# Patient Record
Sex: FEMALE | Race: WHITE | NOT HISPANIC OR LATINO | Employment: FULL TIME | URBAN - METROPOLITAN AREA
[De-identification: names, ages, dates, MRNs, and addresses within clinical notes are randomized per-mention and may not be internally consistent; named-entity substitution may affect disease eponyms.]

---

## 2023-03-30 LAB
CHLAMYDIA TRACH., AMPLIFIED: NEGATIVE
N. GONORRHEA, AMPLIFIED: NEGATIVE

## 2023-09-04 PROBLEM — J45.909 MODERATE ASTHMA WITHOUT COMPLICATION (HHS-HCC): Status: ACTIVE | Noted: 2023-09-04

## 2023-09-04 PROBLEM — R11.0 MODERATE NAUSEA: Status: ACTIVE | Noted: 2023-09-04

## 2023-09-04 PROBLEM — K58.0 IRRITABLE BOWEL SYNDROME WITH DIARRHEA: Status: ACTIVE | Noted: 2023-09-04

## 2023-09-04 PROBLEM — K21.9 GERD (GASTROESOPHAGEAL REFLUX DISEASE): Status: ACTIVE | Noted: 2023-09-04

## 2023-09-04 PROBLEM — H69.90 EUSTACHIAN TUBE DYSFUNCTION: Status: ACTIVE | Noted: 2023-09-04

## 2023-09-04 PROBLEM — R31.29 OTHER MICROSCOPIC HEMATURIA: Status: ACTIVE | Noted: 2023-09-04

## 2023-09-04 PROBLEM — F41.9 ANXIETY: Status: ACTIVE | Noted: 2023-09-04

## 2023-09-04 PROBLEM — R10.13 CHRONIC EPIGASTRIC PAIN: Status: ACTIVE | Noted: 2023-09-04

## 2023-09-04 PROBLEM — G89.29 CHRONIC EPIGASTRIC PAIN: Status: ACTIVE | Noted: 2023-09-04

## 2023-09-04 PROBLEM — R55 VASO VAGAL EPISODE: Status: ACTIVE | Noted: 2023-09-04

## 2023-09-04 PROBLEM — J30.2 OTHER SEASONAL ALLERGIC RHINITIS: Status: ACTIVE | Noted: 2023-09-04

## 2023-09-04 PROBLEM — K29.60 BILE REFLUX GASTRITIS: Status: ACTIVE | Noted: 2023-09-04

## 2023-09-04 PROBLEM — F31.9 BIPOLAR AFFECTIVE DISORDER (MULTI): Status: ACTIVE | Noted: 2023-09-04

## 2023-09-04 PROBLEM — K44.9 HIATAL HERNIA: Status: ACTIVE | Noted: 2023-09-04

## 2023-09-04 RX ORDER — ALBUTEROL SULFATE 0.83 MG/ML
SOLUTION RESPIRATORY (INHALATION)
COMMUNITY
Start: 2018-06-21

## 2023-09-04 RX ORDER — MONTELUKAST SODIUM 4 MG/1
TABLET, CHEWABLE ORAL
COMMUNITY
Start: 2022-04-12 | End: 2023-10-11 | Stop reason: ALTCHOICE

## 2023-09-04 RX ORDER — NORGESTREL AND ETHINYL ESTRADIOL 0.3-0.03MG
1 KIT ORAL DAILY
COMMUNITY
Start: 2023-01-14 | End: 2024-01-22 | Stop reason: SDUPTHER

## 2023-09-04 RX ORDER — BUSPIRONE HYDROCHLORIDE 5 MG/1
1 TABLET ORAL 2 TIMES DAILY
COMMUNITY
Start: 2022-03-03 | End: 2024-04-08 | Stop reason: SDUPTHER

## 2023-09-04 RX ORDER — DESIPRAMINE HYDROCHLORIDE 10 MG/1
1 TABLET ORAL DAILY
COMMUNITY
Start: 2022-05-11 | End: 2023-10-11 | Stop reason: SDUPTHER

## 2023-09-04 RX ORDER — ARIPIPRAZOLE 5 MG/1
1 TABLET ORAL DAILY
COMMUNITY
Start: 2022-03-03 | End: 2023-09-27

## 2023-09-20 ENCOUNTER — OFFICE VISIT (OUTPATIENT)
Dept: PRIMARY CARE | Facility: CLINIC | Age: 33
End: 2023-09-20
Payer: COMMERCIAL

## 2023-09-20 VITALS
HEART RATE: 96 BPM | DIASTOLIC BLOOD PRESSURE: 70 MMHG | BODY MASS INDEX: 26.84 KG/M2 | SYSTOLIC BLOOD PRESSURE: 122 MMHG | WEIGHT: 171 LBS | HEIGHT: 67 IN

## 2023-09-20 DIAGNOSIS — J06.9 UPPER RESPIRATORY TRACT INFECTION, UNSPECIFIED TYPE: Primary | ICD-10-CM

## 2023-09-20 DIAGNOSIS — F31.9 BIPOLAR AFFECTIVE DISORDER, REMISSION STATUS UNSPECIFIED (MULTI): ICD-10-CM

## 2023-09-20 PROCEDURE — 99214 OFFICE O/P EST MOD 30 MIN: CPT | Performed by: STUDENT IN AN ORGANIZED HEALTH CARE EDUCATION/TRAINING PROGRAM

## 2023-09-20 PROCEDURE — 87636 SARSCOV2 & INF A&B AMP PRB: CPT

## 2023-09-20 PROCEDURE — 1036F TOBACCO NON-USER: CPT | Performed by: STUDENT IN AN ORGANIZED HEALTH CARE EDUCATION/TRAINING PROGRAM

## 2023-09-20 NOTE — PROGRESS NOTES
"Evangelina Noriega is a 33 y.o. year old female presenting for Follow-up and Dizziness       HPI:  Patient presents for sick visit. She started feeling symptoms the middle of last week (9/13), vomiting, diarrhea, chills, body aches, headache, lasted 2 days. Felt better over the weekend but then on Tuesday evening (9/19) started feeling fever/chills, congestion, sore throat, dizzy, lightheaded when standing up, slight shortness of breath.     Diarrhea has resolved. Vomited yesterday but thought it was the food she ate Monday night, [employer] chicken strip basket.    Sick exposure was, brother has a cold at home now. Several coworkers were sick last week with similar GI symptoms.    Tries to drink 2 64 ounce bottles of water daily. Has been able to eat okay.    Only taking tylenol and tylenol sinus headache for symptoms. Has not tested herself for flu or COVID.    ROS:   All pertinent positive symptoms are included in history of present illness.    All other systems have been reviewed and are negative and noncontributory to this patient's current ailments.    Current Outpatient Medications   Medication Sig Dispense Refill    albuterol 2.5 mg /3 mL (0.083 %) nebulizer solution USE 1 UNIT DOSE EVERY 4-6 HOURS AS NEEDED FOR WHEEZING .      ARIPiprazole (Abilify) 5 mg tablet Take 1 tablet (5 mg) by mouth once daily.      busPIRone (Buspar) 5 mg tablet Take 1 tablet (5 mg) by mouth 2 times a day.      colestipol (Colestid) 1 gram tablet Colestipol HCl - 1 GM Oral Tablet   Quantity: 60  Refills: 0        Start : 12-Apr-2022   Active      Cryselle, 28, 0.3-30 mg-mcg tablet Take 1 tablet by mouth once daily.      desipramine (Norpramin) 10 mg tablet Take 1 tablet (10 mg) by mouth once daily.       No current facility-administered medications for this visit.       OBJECTIVE  Visit Vitals  /70   Pulse 96   Ht 1.702 m (5' 7\")   Wt 77.6 kg (171 lb)   BMI 26.78 kg/m²   Smoking Status Never   BSA 1.92 m²        Physical " Exam:  GENERAL: Alert, oriented, pleasant, in no acute distress  HEENT: Head normocephalic, atraumatic, posterior pharyngeal erythema and cobblestoning, serous effusion bilateral TMs, nares erythematous bilaterally, no tonsilar hypertrophy or exudate  CV: Heart with regular rate and rhythm, normal S1/S2, no murmurs; normal peripheral pulses- radial and dorsalis pedis palpable  LUNGS: CTAB without wheezing, rhonchi or rales; good respiratory effort, no increased work of breathing  ABDOMEN: soft, non-tender, non-distended, no masses appreciated; +BS in all four quadrants  EXTREMITIES: no edema, no cyanosis  PSYCH: Appropriate mood and affect  SKIN: No rashes or lesions appreciated    Assessment/Plan   Diagnoses and all orders for this visit:  Upper respiratory tract infection, unspecified type  -     Sars-CoV-2 PCR, Symptomatic; Future  -     Influenza A, and B PCR; Future  Continue adequate hydration, vitals are stable, afebrile in office today. Encouraged gatorade, pedialyte and water. Eat as tolerated. Will call with results of swab. Note provided for work today.  Bipolar affective disorder, remission status unspecified (CMS/HCC)  Stable on Abilify and Buspar. Continue current medications.

## 2023-09-21 LAB
FLU A RESULT: NOT DETECTED
FLU B RESULT: NOT DETECTED
SARS-COV-2 RESULT: NOT DETECTED

## 2023-09-27 DIAGNOSIS — F31.9 BIPOLAR AFFECTIVE DISORDER, REMISSION STATUS UNSPECIFIED (MULTI): Primary | ICD-10-CM

## 2023-09-27 RX ORDER — ARIPIPRAZOLE 5 MG/1
5 TABLET ORAL DAILY
Qty: 90 TABLET | Refills: 1 | Status: SHIPPED | OUTPATIENT
Start: 2023-09-27 | End: 2024-02-09 | Stop reason: SINTOL

## 2023-10-11 ENCOUNTER — OFFICE VISIT (OUTPATIENT)
Dept: GASTROENTEROLOGY | Facility: CLINIC | Age: 33
End: 2023-10-11
Payer: COMMERCIAL

## 2023-10-11 VITALS — HEIGHT: 67 IN | HEART RATE: 80 BPM | WEIGHT: 174 LBS | BODY MASS INDEX: 27.31 KG/M2

## 2023-10-11 DIAGNOSIS — K58.0 IRRITABLE BOWEL SYNDROME WITH DIARRHEA: Primary | ICD-10-CM

## 2023-10-11 PROCEDURE — 1036F TOBACCO NON-USER: CPT | Performed by: INTERNAL MEDICINE

## 2023-10-11 PROCEDURE — 99213 OFFICE O/P EST LOW 20 MIN: CPT | Performed by: INTERNAL MEDICINE

## 2023-10-11 RX ORDER — DESIPRAMINE HYDROCHLORIDE 10 MG/1
10 TABLET ORAL DAILY
Qty: 90 TABLET | Refills: 3 | Status: SHIPPED | OUTPATIENT
Start: 2023-10-11 | End: 2024-04-22

## 2023-10-11 ASSESSMENT — ENCOUNTER SYMPTOMS
OCCASIONAL FEELINGS OF UNSTEADINESS: 0
LOSS OF SENSATION IN FEET: 0
DEPRESSION: 1

## 2023-10-11 NOTE — PATIENT INSTRUCTIONS
Continue supplemental milligrams daily.  For occasional constipation take single dose of MiraLAX when constipation starts.  Pursue surveillance colonoscopy in 4 years.  Follow-up in the office as needed.

## 2023-10-11 NOTE — PROGRESS NOTES
REASON FOR VISIT: Follow-up irritable bowel    HPI:  Evangelina Noriega is a 33 y.o. female with a past medical history of diarrhea predominant irritable bowel here for follow-up.  Has been managed on desipramine 10 mg daily.  Colonoscopy last year with subcentimeter tubular adenoma resected and no evidence of microscopic colitis.  Occasionally will have constipation for a day or 2 but diarrhea otherwise controlled.  No unintentional weight loss.        PRIOR ENDOSCOPY  Colonoscopy with subcentimeter tubular adenoma resected in 2022 no evidence of microscopic colitis    PAST MEDICAL HISTORY  Past Medical History:   Diagnosis Date    Personal history of other diseases of the digestive system 12/02/2015    History of indigestion    Personal history of other diseases of the respiratory system     History of asthma    Personal history of other mental and behavioral disorders     History of anxiety    Personal history of other mental and behavioral disorders     History of bipolar disorder    Personal history of other specified conditions 09/30/2015    History of epigastric pain    Personal history of other specified conditions 08/28/2015    History of nausea    Personal history of other specified conditions 08/18/2014    History of dizziness    Personal history of other specified conditions 08/18/2014    History of fatigue       PAST SURGICAL HISTORY  Past Surgical History:   Procedure Laterality Date    OTHER SURGICAL HISTORY  07/01/2014    Prior Surgical Procedure Not Done    OTHER SURGICAL HISTORY  06/21/2018    Oral Surgery Tooth Extraction [place] [facility]       FAMILY HISTORY  Family History   Problem Relation Name Age of Onset    Graves' disease Mother      Stroke Father         SOCIAL HISTORY  Social History     Tobacco Use    Smoking status: Never    Smokeless tobacco: Never   Substance Use Topics    Alcohol use: Yes       REVIEW OF SYSTEMS  CONSTITUTIONAL: negative for fever, chills, fatigue, or unintentional weight  "loss,   HEENT: negative for icteric sclera, eye pain/redness, or changes in vision/hearing  RESPIRATORY: negative for cough, hemoptysis, wheezing, orthopnea, or dyspnea on exertion  CARDIOVASCULAR: negative for chest pain, palpitations, or syncope   GASTROINTESTINAL: as noted per HPI  GENITOURINARY: negative for dysuria, polyuria, incontinence, or hematuria  MUSCULOSKELETAL: negative for arthralgia, myalgia, or joint swelling/stiffness   INTEGUMENTARY/SKIN: negative jaundice, rash, or skin lesion  HEMATOLOGIC/LYMPHATIC: negative for prolonged bleeding, easy bruising, or swollen lymph nodes  ENDOCRINE: negative for cold/heat intolerance, polydipsia, polyuria, or goiter  NEUROLOGIC: negative for headaches, dizziness, tremor, or gait abnormality  PSYCHIATRIC: negative for anxiety, depression, personality changes, or sleep disturbances      A 10 point review of systems was completed and was otherwise negative.    ALLERGIES  Allergies   Allergen Reactions    Amoxicillin Hives    Nitrofurantoin Monohyd/M-Cryst Hives       MEDICATIONS  Current Outpatient Medications   Medication Sig Dispense Refill    albuterol 2.5 mg /3 mL (0.083 %) nebulizer solution USE 1 UNIT DOSE EVERY 4-6 HOURS AS NEEDED FOR WHEEZING .      ARIPiprazole (Abilify) 5 mg tablet TAKE 1 TABLET BY MOUTH EVERY DAY 90 tablet 1    busPIRone (Buspar) 5 mg tablet Take 1 tablet (5 mg) by mouth 2 times a day.      Cryselle, 28, 0.3-30 mg-mcg tablet Take 1 tablet by mouth once daily.      colestipol (Colestid) 1 gram tablet Colestipol HCl - 1 GM Oral Tablet   Quantity: 60  Refills: 0        Start : 12-Apr-2022   Active      desipramine (Norpramin) 10 mg tablet Take 1 tablet (10 mg) by mouth once daily. 90 tablet 3     No current facility-administered medications for this visit.       VITALS  Pulse 80   Ht 1.702 m (5' 7\")   Wt 78.9 kg (174 lb)   BMI 27.25 kg/m²      PHYSICAL EXAM  CONSTITUTIONAL: no acute distress, appears stated age, well-appearing  EYES: " anicteric sclera, sclera clear, no conjunctival pallor  HEAD: normocephalic, atraumatic   NECK: supple   PULMONARY: clear to auscultation bilaterally, no increased work of breathing   CARDIOVASCULAR: regular rate and rhythm, no murmurs/rubs/gallops appreciated  ABDOMEN: soft, non-tender, positive bowel sounds, no rebound or guarding, no appreciable hepatosplenomegaly  LYMPHATIC: no palpable lymphadenopathy in the neck  MUSCULOSKELETAL: normal gait, no cyanosis/clubbing/edema  SKIN: no jaundice or visible rash  NEUROLOGIC: no gross motor deficits, cranial nerves grossly intact  PSYCHIATRIC: alert and oriented to person/place/time, appropriate insight and judgement        ASSESSMENT  Therapy IBS managed on desipramine 10 mg daily.  Occasional constipation.  She had subcentimeter tubular adenoma resected on colonoscopy in 2022 and no evidence of microscopic colitis then.    PLAN  Continue supplemental milligrams daily.  For occasional constipation take single dose of MiraLAX when constipation starts.  Pursue surveillance colonoscopy in 4 years.  Follow-up in the office as needed.        Signature: Michael Nagy MD    Date: 10/11/2023  Time: 2:26 PM

## 2024-01-22 DIAGNOSIS — Z30.9 ENCOUNTER FOR CONTRACEPTIVE MANAGEMENT, UNSPECIFIED TYPE: ICD-10-CM

## 2024-01-22 RX ORDER — NORGESTREL AND ETHINYL ESTRADIOL 0.3-0.03MG
1 KIT ORAL DAILY
Qty: 28 TABLET | Refills: 4 | Status: SHIPPED | OUTPATIENT
Start: 2024-01-22 | End: 2024-02-13 | Stop reason: SDUPTHER

## 2024-01-22 NOTE — TELEPHONE ENCOUNTER
Evangelina's mom scheduled her an annual with Rhonda Cullen on 04/01/24, she states Evangelina needs refills of Cryselle 28, 0.3-30 MG-MCG to be sent to [facility]

## 2024-01-31 ENCOUNTER — APPOINTMENT (OUTPATIENT)
Dept: PRIMARY CARE | Facility: CLINIC | Age: 34
End: 2024-01-31
Payer: MEDICAID

## 2024-02-09 ENCOUNTER — OFFICE VISIT (OUTPATIENT)
Dept: PRIMARY CARE | Facility: CLINIC | Age: 34
End: 2024-02-09
Payer: COMMERCIAL

## 2024-02-09 VITALS
DIASTOLIC BLOOD PRESSURE: 88 MMHG | WEIGHT: 160 LBS | SYSTOLIC BLOOD PRESSURE: 140 MMHG | HEIGHT: 67 IN | BODY MASS INDEX: 25.11 KG/M2 | HEART RATE: 102 BPM

## 2024-02-09 DIAGNOSIS — J45.909 MODERATE ASTHMA WITHOUT COMPLICATION, UNSPECIFIED WHETHER PERSISTENT (HHS-HCC): ICD-10-CM

## 2024-02-09 DIAGNOSIS — Z00.00 ENCOUNTER FOR PREVENTIVE HEALTH EXAMINATION: ICD-10-CM

## 2024-02-09 DIAGNOSIS — Z11.59 ENCOUNTER FOR HEPATITIS C SCREENING TEST FOR LOW RISK PATIENT: ICD-10-CM

## 2024-02-09 DIAGNOSIS — F31.9 BIPOLAR AFFECTIVE DISORDER, REMISSION STATUS UNSPECIFIED (MULTI): Primary | ICD-10-CM

## 2024-02-09 PROBLEM — R19.7 DIARRHEA: Status: ACTIVE | Noted: 2021-11-18

## 2024-02-09 PROCEDURE — 1036F TOBACCO NON-USER: CPT | Performed by: STUDENT IN AN ORGANIZED HEALTH CARE EDUCATION/TRAINING PROGRAM

## 2024-02-09 PROCEDURE — 99214 OFFICE O/P EST MOD 30 MIN: CPT | Performed by: STUDENT IN AN ORGANIZED HEALTH CARE EDUCATION/TRAINING PROGRAM

## 2024-02-09 RX ORDER — LAMOTRIGINE 25(42)-100
KIT ORAL
Qty: 1 KIT | Refills: 0 | Status: SHIPPED | OUTPATIENT
Start: 2024-02-09 | End: 2024-02-12

## 2024-02-09 ASSESSMENT — PATIENT HEALTH QUESTIONNAIRE - PHQ9
SUM OF ALL RESPONSES TO PHQ9 QUESTIONS 1 AND 2: 0
1. LITTLE INTEREST OR PLEASURE IN DOING THINGS: NOT AT ALL
2. FEELING DOWN, DEPRESSED OR HOPELESS: NOT AT ALL

## 2024-02-09 NOTE — PROGRESS NOTES
"Today's Date: 2/9/2024       HPI: Evangelina Noriega is a 34 y.o. female who presents for mental health.      Bipolar affective disorder  Patient was recently prescribed seroquel, was on it for a week and a half until she experienced a panic attack where she was subsequently seen in the urgent care, was told to stop seroquel. States this medication made her drowsy, jittery, and had increased anxiety. Has not been on a mood stabilizer since. Had previously tried Latuda, states it was the worst medication she had ever taken, experienced many side effects. Also tried Abilify, states it helped with her anxiety but didn't help stabilize her mood well enough. She had been on Lamictal in the past, states she believes she was taking 200 mg. Says it was effective for quite some time until it began to \"wear off.\" Patient denies SI/HI.     Objective:  Vitals: Blood pressure 140/88, pulse 102, height 1.702 m (5' 7\"), weight 72.6 kg (160 lb).    Body mass index is 25.06 kg/m².     Physical Examination:      General: No acute distress, well nourished  Eyes: EOMI  Ears: External ears intact, no gross fluid drainage, swelling, or erythema  Pulmonary: Clear to auscultation bilaterally, no wheezing, no rales, no accessory muscle use  Cardiac: RRR, no murmurs or rubs  Extremities: No clubbing, cyanosis, or edema  Psychiatric: Affect and mood is appropriate  Neurologic: AOx3  Skin: No rashes observed, warm     Assessment and Plan:     1. Bipolar affective disorder, remission status unspecified (CMS/HCC)  lamoTRIgine (LaMICtal Starter, Orange, Kit) 25 mg (42) -100 mg (7) tablets,dose pack      2. Moderate asthma without complication, unspecified whether persistent        3. Encounter for preventive health examination  CBC    Comprehensive Metabolic Panel    Hemoglobin A1C    Lipid Panel    TSH with reflex to Free T4 if abnormal      4. Encounter for hepatitis C screening test for low risk patient  Hepatitis C Antibody           1. Bipolar " affective disorder    Rx: Lamotrigine titration as directed  Call office if mood worsens after initiating this medication     2. Preventative health  Routine blood work ordered - FLP, CBC, CMP, TSH w/ reflex, A1C  Hep C antibody screening for low risk patient     3. Asthma  Controlled on PRN albuterol        All questions answered. Patient understands and agrees to the plan.      Please follow-up in one month or sooner if needed

## 2024-02-12 ENCOUNTER — TELEPHONE (OUTPATIENT)
Dept: PRIMARY CARE | Facility: CLINIC | Age: 34
End: 2024-02-12
Payer: MEDICAID

## 2024-02-12 DIAGNOSIS — F31.32 BIPOLAR AFFECTIVE DISORDER, CURRENTLY DEPRESSED, MODERATE (MULTI): Primary | ICD-10-CM

## 2024-02-12 RX ORDER — LAMOTRIGINE 25 MG/1
TABLET ORAL
Qty: 70 TABLET | Refills: 0 | Status: SHIPPED | OUTPATIENT
Start: 2024-02-12 | End: 2024-03-19

## 2024-02-12 NOTE — TELEPHONE ENCOUNTER
Mom called and asked if we could send over the Lamictal in three separate scripts so it would be cheaper.

## 2024-02-13 ENCOUNTER — OFFICE VISIT (OUTPATIENT)
Dept: OBSTETRICS AND GYNECOLOGY | Facility: CLINIC | Age: 34
End: 2024-02-13
Payer: COMMERCIAL

## 2024-02-13 VITALS — WEIGHT: 163.8 LBS | DIASTOLIC BLOOD PRESSURE: 62 MMHG | BODY MASS INDEX: 25.66 KG/M2 | SYSTOLIC BLOOD PRESSURE: 100 MMHG

## 2024-02-13 DIAGNOSIS — Z01.419 ENCOUNTER FOR WELL WOMAN EXAM WITH ROUTINE GYNECOLOGICAL EXAM: Primary | ICD-10-CM

## 2024-02-13 DIAGNOSIS — Z30.9 ENCOUNTER FOR CONTRACEPTIVE MANAGEMENT, UNSPECIFIED TYPE: ICD-10-CM

## 2024-02-13 PROCEDURE — 1036F TOBACCO NON-USER: CPT | Performed by: NURSE PRACTITIONER

## 2024-02-13 PROCEDURE — 99395 PREV VISIT EST AGE 18-39: CPT | Performed by: NURSE PRACTITIONER

## 2024-02-13 RX ORDER — NORGESTREL AND ETHINYL ESTRADIOL 0.3-0.03MG
1 KIT ORAL DAILY
Qty: 84 TABLET | Refills: 3 | Status: SHIPPED | OUTPATIENT
Start: 2024-02-13 | End: 2025-02-12

## 2024-02-13 NOTE — PROGRESS NOTES
HPI:   Evangelina Noriega is a 34 y.o. who presents today for her annual gynecologic exam without complaints    She has the following concerns; None. She is doing well. Working on medication management for her bipolar disorder. She has no GYN concerns. She needs refills of her ocp.     GYN HISTORY:  Periods are regular every 28-30 days, lasting 4 days.   Dysmenorrhea:none. Cyclic symptoms include none.   No intermenstrual bleeding, spotting, or discharge.    Current contraception: OCP (estrogen/progesterone)      Requests STD testing: no     PAP History   Last pap:   2023 Normal HPV Negative  History of abnormal pap: yes - ASCUS in 2016.   HPV vaccine: yes - x 1 dose.   @paphx@    Health Screening  Family history of breast, uterine, ovarian or colon cancer: no         The patient feels safe at home.         Review of Systems:   Constitutional: no fever and no chills.  Cardiovascular: no chest pain.   Respiratory: no shortness of breath.   Gastrointestinal: no nausea, no abdominal pain and no constipation  Genitourinary: no dysuria, no urinary incontinence, no vaginal dryness, no pelvic pain and no vaginal discharge.   Neurological: no headache.  Psychiatric: no anxiety and no depression.              Objective         /62   Wt 74.3 kg (163 lb 12.8 oz)   LMP 01/23/2024 (Approximate)   BMI 25.66 kg/m²         Physical Exam:   Constitutional: Alert and in no acute distress. Well developed, well nourished.      Neck: No neck asymmetry. Supple. Thyroid not enlarged and there were no palpable thyroid nodules.      Cardiovascular: Heart rate and rhythm were normal, normal S1 and S2, no gallops, and no murmurs.      Pulmonary: No respiratory distress. Clear bilateral breath sounds.      Chest: Breasts: Normal appearance, no nipple discharge and no skin changes. Palpation of breasts and axillae: No palpable mass and no axillary lymphadenopathy.      Abdomen: Soft nontender; no abdominal mass palpated. Normal  bowel sounds. No organomegaly.      Genitourinary:   - External genitalia: Normal.   - Palpation of lymph nodes in groin: No inguinal lymphadenopathy.   - Bartholin's Urethral and Skenes Glands: Normal.   - Urethra: Normal.    -Bladder: Normal on palpation.   - Vagina: Normal.   - Cervix: Normal.   - Uterus: Normal. Right Adnexa/parametria: Normal. Left Adnexa/parametria: Normal.   - Perianal Area: Normal.      Skin: Normal skin color and pigmentation, normal skin turgor, and no rash     Psychiatric: Alert and oriented x 3. Affect normal to patient baseline. Mood: Appropriate.            Assessment/Plan       Diagnoses and all orders for this visit:  Encounter for well woman exam with routine gynecological exam  Evangelina presents for well woman exam. She is doing well. Working on some medication changes. Has started taking Lamictal as this has worked well for her in the past. Discussed use of Lamictal and ocp, may decrease effectiveness of both meds. She verbalizes understanding. Encouraged condom use.    Encounter for contraceptive management, unspecified type  -     norgestrel-ethinyl estradioL (Cryselle, Jr,) 0.3-30 mg-mcg tablet; Take 1 tablet by mouth once daily.  Follow-up as needed and annually for well woman exam.       SHARONDA Penny-CNP

## 2024-02-28 ENCOUNTER — LAB (OUTPATIENT)
Dept: LAB | Facility: LAB | Age: 34
End: 2024-02-28
Payer: COMMERCIAL

## 2024-02-28 DIAGNOSIS — Z00.00 ENCOUNTER FOR PREVENTIVE HEALTH EXAMINATION: ICD-10-CM

## 2024-02-28 DIAGNOSIS — Z11.59 ENCOUNTER FOR HEPATITIS C SCREENING TEST FOR LOW RISK PATIENT: ICD-10-CM

## 2024-02-28 LAB
ALBUMIN SERPL BCP-MCNC: 4 G/DL (ref 3.4–5)
ALP SERPL-CCNC: 79 U/L (ref 33–110)
ALT SERPL W P-5'-P-CCNC: 8 U/L (ref 7–45)
ANION GAP SERPL CALC-SCNC: 15 MMOL/L (ref 10–20)
AST SERPL W P-5'-P-CCNC: 12 U/L (ref 9–39)
BILIRUB SERPL-MCNC: 0.6 MG/DL (ref 0–1.2)
BUN SERPL-MCNC: 10 MG/DL (ref 6–23)
CALCIUM SERPL-MCNC: 9 MG/DL (ref 8.6–10.6)
CHLORIDE SERPL-SCNC: 101 MMOL/L (ref 98–107)
CHOLEST SERPL-MCNC: 143 MG/DL (ref 0–199)
CHOLESTEROL/HDL RATIO: 1.8
CO2 SERPL-SCNC: 27 MMOL/L (ref 21–32)
CREAT SERPL-MCNC: 0.8 MG/DL (ref 0.5–1.05)
EGFRCR SERPLBLD CKD-EPI 2021: >90 ML/MIN/1.73M*2
ERYTHROCYTE [DISTWIDTH] IN BLOOD BY AUTOMATED COUNT: 12.7 % (ref 11.5–14.5)
EST. AVERAGE GLUCOSE BLD GHB EST-MCNC: 97 MG/DL
GLUCOSE SERPL-MCNC: 82 MG/DL (ref 74–99)
HBA1C MFR BLD: 5 %
HCT VFR BLD AUTO: 42.4 % (ref 36–46)
HCV AB SER QL: NONREACTIVE
HDLC SERPL-MCNC: 77.8 MG/DL
HGB BLD-MCNC: 13.6 G/DL (ref 12–16)
LDLC SERPL CALC-MCNC: 43 MG/DL
MCH RBC QN AUTO: 31 PG (ref 26–34)
MCHC RBC AUTO-ENTMCNC: 32.1 G/DL (ref 32–36)
MCV RBC AUTO: 97 FL (ref 80–100)
NON HDL CHOLESTEROL: 65 MG/DL (ref 0–149)
NRBC BLD-RTO: 0 /100 WBCS (ref 0–0)
PLATELET # BLD AUTO: 277 X10*3/UL (ref 150–450)
POTASSIUM SERPL-SCNC: 3.8 MMOL/L (ref 3.5–5.3)
PROT SERPL-MCNC: 6.7 G/DL (ref 6.4–8.2)
RBC # BLD AUTO: 4.39 X10*6/UL (ref 4–5.2)
SODIUM SERPL-SCNC: 139 MMOL/L (ref 136–145)
T4 FREE SERPL-MCNC: 1.07 NG/DL (ref 0.78–1.48)
TRIGL SERPL-MCNC: 113 MG/DL (ref 0–149)
TSH SERPL-ACNC: 5.24 MIU/L (ref 0.44–3.98)
VLDL: 23 MG/DL (ref 0–40)
WBC # BLD AUTO: 8.7 X10*3/UL (ref 4.4–11.3)

## 2024-02-28 PROCEDURE — 83036 HEMOGLOBIN GLYCOSYLATED A1C: CPT

## 2024-02-28 PROCEDURE — 36415 COLL VENOUS BLD VENIPUNCTURE: CPT

## 2024-02-28 PROCEDURE — 84439 ASSAY OF FREE THYROXINE: CPT

## 2024-02-28 PROCEDURE — 80061 LIPID PANEL: CPT

## 2024-02-28 PROCEDURE — 84443 ASSAY THYROID STIM HORMONE: CPT

## 2024-02-28 PROCEDURE — 80053 COMPREHEN METABOLIC PANEL: CPT

## 2024-02-28 PROCEDURE — 86803 HEPATITIS C AB TEST: CPT

## 2024-02-28 PROCEDURE — 85027 COMPLETE CBC AUTOMATED: CPT

## 2024-03-01 DIAGNOSIS — R94.6 ABNORMAL THYROID FUNCTION TEST: Primary | ICD-10-CM

## 2024-03-12 ENCOUNTER — LAB (OUTPATIENT)
Dept: LAB | Facility: LAB | Age: 34
End: 2024-03-12
Payer: MEDICAID

## 2024-03-12 DIAGNOSIS — R94.6 ABNORMAL THYROID FUNCTION TEST: ICD-10-CM

## 2024-03-12 LAB — TSH SERPL-ACNC: 2.01 MIU/L (ref 0.44–3.98)

## 2024-03-12 PROCEDURE — 36415 COLL VENOUS BLD VENIPUNCTURE: CPT

## 2024-03-12 PROCEDURE — 84443 ASSAY THYROID STIM HORMONE: CPT

## 2024-03-19 DIAGNOSIS — F31.32 BIPOLAR AFFECTIVE DISORDER, CURRENTLY DEPRESSED, MODERATE (MULTI): ICD-10-CM

## 2024-03-19 RX ORDER — LAMOTRIGINE 25 MG/1
100 TABLET ORAL DAILY
Qty: 360 TABLET | Refills: 0 | Status: SHIPPED | OUTPATIENT
Start: 2024-03-19 | End: 2024-06-07 | Stop reason: SDUPTHER

## 2024-04-01 ENCOUNTER — APPOINTMENT (OUTPATIENT)
Dept: OBSTETRICS AND GYNECOLOGY | Facility: CLINIC | Age: 34
End: 2024-04-01
Payer: MEDICAID

## 2024-04-07 ENCOUNTER — PATIENT MESSAGE (OUTPATIENT)
Dept: PRIMARY CARE | Facility: CLINIC | Age: 34
End: 2024-04-07
Payer: MEDICAID

## 2024-04-07 DIAGNOSIS — F41.9 ANXIETY: Primary | ICD-10-CM

## 2024-04-08 RX ORDER — BUSPIRONE HYDROCHLORIDE 5 MG/1
5 TABLET ORAL 2 TIMES DAILY
Qty: 180 TABLET | Refills: 0 | Status: SHIPPED | OUTPATIENT
Start: 2024-04-08 | End: 2024-07-07

## 2024-04-20 DIAGNOSIS — K58.0 IRRITABLE BOWEL SYNDROME WITH DIARRHEA: ICD-10-CM

## 2024-04-22 RX ORDER — DESIPRAMINE HYDROCHLORIDE 10 MG/1
10 TABLET ORAL DAILY
Qty: 90 TABLET | Refills: 1 | Status: SHIPPED | OUTPATIENT
Start: 2024-04-22

## 2024-05-03 ENCOUNTER — TELEPHONE (OUTPATIENT)
Dept: OBSTETRICS AND GYNECOLOGY | Facility: CLINIC | Age: 34
End: 2024-05-03
Payer: MEDICAID

## 2024-05-03 NOTE — TELEPHONE ENCOUNTER
Patient called stating today she is experiencing large bloody tissue / clot as well as cramping. She would like to know if she should be seen for this? Please advise.

## 2024-05-03 NOTE — TELEPHONE ENCOUNTER
Spoke with patient ,pt reports no concerns for pregnancy- Appointment scheduled for Monday in [place] advised to go ER if she develops heavy bleeding or large blood clots

## 2024-05-06 ENCOUNTER — OFFICE VISIT (OUTPATIENT)
Dept: OBSTETRICS AND GYNECOLOGY | Facility: CLINIC | Age: 34
End: 2024-05-06
Payer: MEDICAID

## 2024-05-06 VITALS
SYSTOLIC BLOOD PRESSURE: 110 MMHG | WEIGHT: 167.11 LBS | DIASTOLIC BLOOD PRESSURE: 70 MMHG | BODY MASS INDEX: 26.17 KG/M2

## 2024-05-06 DIAGNOSIS — R35.0 URINARY FREQUENCY: ICD-10-CM

## 2024-05-06 DIAGNOSIS — N93.9 ABNORMAL UTERINE BLEEDING (AUB): Primary | ICD-10-CM

## 2024-05-06 LAB — PREGNANCY TEST URINE, POC: NEGATIVE

## 2024-05-06 PROCEDURE — 81025 URINE PREGNANCY TEST: CPT | Performed by: NURSE PRACTITIONER

## 2024-05-06 PROCEDURE — 99213 OFFICE O/P EST LOW 20 MIN: CPT | Performed by: NURSE PRACTITIONER

## 2024-05-06 PROCEDURE — 87086 URINE CULTURE/COLONY COUNT: CPT

## 2024-05-06 PROCEDURE — 1036F TOBACCO NON-USER: CPT | Performed by: NURSE PRACTITIONER

## 2024-05-06 ASSESSMENT — ENCOUNTER SYMPTOMS: FREQUENCY: 1

## 2024-05-06 NOTE — PROGRESS NOTES
Subjective   Patient ID: Evangelina Noriega is a 34 y.o. female who presents for Vaginal Bleeding (Passed a bloody tissue on 05/03/2024 ,cramping/ Reviewing  EMR/Last Annual Exam:  02/13/2024/Negative Pap / Negative HPV 2023/- patient declined a chaperone- ).  Vaginal Bleeding  Associated symptoms include frequency.     Here for irregular bleeding x 1 episode. Passed a clot on Friday morning. About the size of an egg. She is not due to start a period until next week. No bleeding since then. But some light cramping off and on.   Denies any chance of pregnancy.   She also c/o loss of urine. Denies urgency, some frequency. No hematuria.   Urine HCG is negative today.     Review of Systems   Genitourinary:  Positive for frequency and vaginal bleeding.   All other systems reviewed and are negative.      Objective   Physical Exam  Constitutional:       Appearance: Normal appearance.   Pulmonary:      Effort: Pulmonary effort is normal.   Neurological:      Mental Status: She is alert.   Psychiatric:         Mood and Affect: Mood normal.         Behavior: Behavior normal.         Assessment/Plan   Diagnoses and all orders for this visit:  Abnormal uterine bleeding (AUB)  -     US PELVIS TRANSABDOMINAL WITH TRANSVAGINAL; Future  -     POCT pregnancy, urine manually resulted  Urinary frequency  -     Urine culture  Monitor bleeding, follow-up pending results of ultrasound.        SHARONDA Penny-CNP 05/06/24 11:18 AM

## 2024-05-07 LAB — BACTERIA UR CULT: NORMAL

## 2024-05-14 PROCEDURE — 87086 URINE CULTURE/COLONY COUNT: CPT

## 2024-05-15 ENCOUNTER — HOSPITAL ENCOUNTER (OUTPATIENT)
Dept: RADIOLOGY | Facility: EXTERNAL LOCATION | Age: 34
Discharge: HOME | End: 2024-05-15
Payer: MEDICAID

## 2024-05-15 ENCOUNTER — LAB REQUISITION (OUTPATIENT)
Dept: LAB | Facility: HOSPITAL | Age: 34
End: 2024-05-15
Payer: MEDICAID

## 2024-05-15 DIAGNOSIS — M54.50 BACK PAIN, LUMBOSACRAL: ICD-10-CM

## 2024-05-15 DIAGNOSIS — R10.2 PAIN PELVIC: ICD-10-CM

## 2024-05-16 LAB — BACTERIA UR CULT: NORMAL

## 2024-05-22 ENCOUNTER — HOSPITAL ENCOUNTER (OUTPATIENT)
Dept: RADIOLOGY | Facility: CLINIC | Age: 34
Discharge: HOME | End: 2024-05-22
Payer: MEDICAID

## 2024-05-22 DIAGNOSIS — N93.9 ABNORMAL UTERINE BLEEDING (AUB): ICD-10-CM

## 2024-05-22 PROCEDURE — 76830 TRANSVAGINAL US NON-OB: CPT | Performed by: RADIOLOGY

## 2024-05-22 PROCEDURE — 76856 US EXAM PELVIC COMPLETE: CPT

## 2024-05-22 PROCEDURE — 76856 US EXAM PELVIC COMPLETE: CPT | Performed by: RADIOLOGY

## 2024-05-24 ENCOUNTER — TELEPHONE (OUTPATIENT)
Dept: OBSTETRICS AND GYNECOLOGY | Facility: CLINIC | Age: 34
End: 2024-05-24
Payer: MEDICAID

## 2024-06-03 ENCOUNTER — APPOINTMENT (OUTPATIENT)
Dept: PRIMARY CARE | Facility: CLINIC | Age: 34
End: 2024-06-03
Payer: MEDICAID

## 2024-06-03 ENCOUNTER — OFFICE VISIT (OUTPATIENT)
Dept: OBSTETRICS AND GYNECOLOGY | Facility: CLINIC | Age: 34
End: 2024-06-03
Payer: MEDICAID

## 2024-06-03 VITALS
WEIGHT: 163.36 LBS | BODY MASS INDEX: 25.59 KG/M2 | DIASTOLIC BLOOD PRESSURE: 60 MMHG | SYSTOLIC BLOOD PRESSURE: 110 MMHG

## 2024-06-03 DIAGNOSIS — N93.9 ABNORMAL UTERINE BLEEDING (AUB): Primary | ICD-10-CM

## 2024-06-03 PROCEDURE — 1036F TOBACCO NON-USER: CPT | Performed by: NURSE PRACTITIONER

## 2024-06-03 PROCEDURE — 99212 OFFICE O/P EST SF 10 MIN: CPT | Performed by: NURSE PRACTITIONER

## 2024-06-03 ASSESSMENT — ENCOUNTER SYMPTOMS
BACK PAIN: 1
HEADACHES: 0
HEMATURIA: 0
ABDOMINAL PAIN: 0
CONSTIPATION: 0
FEVER: 0
SORE THROAT: 0
FLANK PAIN: 0
NAUSEA: 0
FREQUENCY: 0
CHILLS: 0
ANOREXIA: 0
DYSURIA: 0
VOMITING: 0
DIARRHEA: 0

## 2024-06-03 NOTE — PROGRESS NOTES
"Subjective   Patient ID: Evangelina Noriega is a 34 y.o. female who presents for Follow-up (Ultrasound/Reviewing  EMR/Last Annual Exam: 02/13/2024/Negative Pap / Negative HPV 2023/-patient declined a chaperone-/).  Female  Problem  The patient's primary symptoms include genital itching. The patient's pertinent negatives include no genital lesions, genital odor, genital rash, missed menses, pelvic pain, vaginal bleeding or vaginal discharge. This is a new problem. The current episode started more than 1 month ago. The problem occurs daily. The problem has been waxing and waning. The pain is mild. The problem affects both sides. She is not pregnant. Associated symptoms include back pain, joint pain and urgency. Pertinent negatives include no abdominal pain, anorexia, chills, constipation, diarrhea, discolored urine, dysuria, fever, flank pain, frequency, headaches, hematuria, joint swelling, nausea, painful intercourse, rash, sore throat or vomiting. Nothing aggravates the symptoms. She is sexually active. No, her partner does not have an STD. She uses oral contraceptives for contraception. Her menstrual history has been regular.     Here for ultrasound follow-up. She is on an extended cycle pill. She is having some continued cramping. Feels like she \"needs to have a period\". No further episodes of heavy bleeding or passing clots.     Her ultrasound showed:   FINDINGS:  UTERUS:  The uterus measures 2.9 cm x 2.5 cm x 5.0 cm. There are several small  nabothian cysts. An area measuring 1.6 x 1.2 cm is annotated at the  endocervical level anteriorly, probably artifactual from scanning  angle. However soft tissue fullness from endocervical fibroid or  other mass is not excluded on the presented images. Clinical  correlation recommended. The myometrium otherwise is homogeneous.      ENDOMETRIUM:  The endometrium measures a thickness of 0.2 cm, which is normal.      RIGHT ADNEXA:  The ovary was not visualized probably due to " overlying bowel, but as  visualized no mass or collection at the adnexa.      LEFT ADNEXA:  The left ovary measures 1.2 cm x 1.1 cm x 3.1 cm and demonstrates  normal blood flow. Parenchymal texture:  Several follicles, otherwise  homogeneous.          CUL DE SAC:  No gross free fluid is seen in the pelvic cul-de-sac.      OTHER:  None significant.      IMPRESSION:  1.  Soft tissue prominence suggested on several images at the  endocervical level, probably artifactual but clinical correlation  recommended.  2. The right ovary is not visualized. Otherwise unremarkable.  Review of Systems   Constitutional:  Negative for chills and fever.   HENT:  Negative for sore throat.    Gastrointestinal:  Negative for abdominal pain, anorexia, constipation, diarrhea, nausea and vomiting.   Genitourinary:  Positive for urgency. Negative for dysuria, flank pain, frequency, hematuria, missed menses, pelvic pain and vaginal discharge.   Musculoskeletal:  Positive for back pain and joint pain.   Skin:  Negative for rash.   Neurological:  Negative for headaches.       Objective   Physical Exam  Constitutional:       Appearance: Normal appearance.   Pulmonary:      Effort: Pulmonary effort is normal.   Genitourinary:     General: Normal vulva.      Vagina: Normal.      Cervix: Normal.      Uterus: Normal.       Adnexa: Right adnexa normal and left adnexa normal.   Neurological:      Mental Status: She is alert.   Psychiatric:         Mood and Affect: Mood normal.         Behavior: Behavior normal.         Assessment/Plan   Diagnoses and all orders for this visit:  Abnormal uterine bleeding (AUB)  Here for follow-up for AUB and to review the results of her ultrasound. Her exam is normal today. Ultrasound states she may have an endocervical fibroid or may be artifact from scanning. Since she is no longer having any episodes of bleeding or passing clots- we discussed the following plan; she will monitor her bleeding/ cramping through her  next cycle. If she continues to have pain, will refer to the physicians for further follow-up of possible fibroid. If her symptoms are improved after her next cycle, she will continue to monitor.        KAREN Penny 06/03/24 2:24 PM

## 2024-06-06 ASSESSMENT — ENCOUNTER SYMPTOMS
VOMITING: 0
SLURRED SPEECH: 0
ALTERED MENTAL STATUS: 1
FATIGUE: 1
LOSS OF BALANCE: 1
BACK PAIN: 1
HEADACHES: 1
ABDOMINAL PAIN: 0
CLUMSINESS: 1
FOCAL WEAKNESS: 1
NEAR-SYNCOPE: 1
NECK PAIN: 1
SHORTNESS OF BREATH: 0
PALPITATIONS: 0
AURA: 0
FOCAL SENSORY LOSS: 0
LIGHT-HEADEDNESS: 1
VISUAL CHANGE: 0
WEAKNESS: 0
DIAPHORESIS: 1
DIZZINESS: 1
FEVER: 0
CONFUSION: 1
BOWEL INCONTINENCE: 0
NAUSEA: 1
MEMORY LOSS: 1
NEUROLOGIC COMPLAINT: 1
VERTIGO: 0

## 2024-06-07 ENCOUNTER — LAB (OUTPATIENT)
Dept: LAB | Facility: LAB | Age: 34
End: 2024-06-07
Payer: MEDICAID

## 2024-06-07 ENCOUNTER — OFFICE VISIT (OUTPATIENT)
Dept: PRIMARY CARE | Facility: CLINIC | Age: 34
End: 2024-06-07
Payer: MEDICAID

## 2024-06-07 VITALS
BODY MASS INDEX: 25.43 KG/M2 | DIASTOLIC BLOOD PRESSURE: 110 MMHG | HEIGHT: 67 IN | SYSTOLIC BLOOD PRESSURE: 150 MMHG | WEIGHT: 162 LBS | HEART RATE: 136 BPM

## 2024-06-07 DIAGNOSIS — R00.0 INCREASED HEART RATE: ICD-10-CM

## 2024-06-07 DIAGNOSIS — R20.0 NUMBNESS AND TINGLING: ICD-10-CM

## 2024-06-07 DIAGNOSIS — R20.2 NUMBNESS AND TINGLING: ICD-10-CM

## 2024-06-07 DIAGNOSIS — H69.91 EUSTACHIAN TUBE DYSFUNCTION, RIGHT: ICD-10-CM

## 2024-06-07 DIAGNOSIS — F31.32 BIPOLAR AFFECTIVE DISORDER, CURRENTLY DEPRESSED, MODERATE (MULTI): ICD-10-CM

## 2024-06-07 DIAGNOSIS — F31.0 BIPOLAR AFFECTIVE DISORDER, CURRENT EPISODE HYPOMANIC (MULTI): Primary | ICD-10-CM

## 2024-06-07 LAB
TSH SERPL-ACNC: 1.59 MIU/L (ref 0.44–3.98)
VIT B12 SERPL-MCNC: 108 PG/ML (ref 211–911)

## 2024-06-07 PROCEDURE — 84443 ASSAY THYROID STIM HORMONE: CPT

## 2024-06-07 PROCEDURE — 82607 VITAMIN B-12: CPT

## 2024-06-07 PROCEDURE — 1036F TOBACCO NON-USER: CPT | Performed by: STUDENT IN AN ORGANIZED HEALTH CARE EDUCATION/TRAINING PROGRAM

## 2024-06-07 PROCEDURE — 99214 OFFICE O/P EST MOD 30 MIN: CPT | Performed by: STUDENT IN AN ORGANIZED HEALTH CARE EDUCATION/TRAINING PROGRAM

## 2024-06-07 PROCEDURE — 36415 COLL VENOUS BLD VENIPUNCTURE: CPT

## 2024-06-07 RX ORDER — LAMOTRIGINE 25 MG/1
50 TABLET ORAL DAILY
Qty: 180 TABLET | Refills: 0 | Status: SHIPPED | OUTPATIENT
Start: 2024-06-07 | End: 2024-09-05

## 2024-06-07 RX ORDER — FLUTICASONE PROPIONATE 50 MCG
1 SPRAY, SUSPENSION (ML) NASAL DAILY
Qty: 16 G | Refills: 0 | Status: SHIPPED | OUTPATIENT
Start: 2024-06-07 | End: 2024-07-07

## 2024-06-07 NOTE — PROGRESS NOTES
"Subjective   Patient ID: Evangelina Noriega is a 34 y.o. female who presents for Medication Problem.  She is 34 years old female came today to the office with her mother for follow-up after recent change of her dose Lamictal from 50 to 100 mg(this was done in February 2024).  She is complaining about her racing thoughts, mood swinging , have suicidal ideation but not a plan , insomnia ,anxiety, fatigue, hot flashes, cold, nerve pain especially at night feeling numb in all 4 extremities, chest tightness usually at night(feels like a pressure, like elephant is sitting on her chest).  She also has heart racing, palpitation, decreased gripping strength. She has nausea, decreased appetite (she lost about 20 to 25 pounds in 6 months ),diarrhea 2 times daily but no vomiting.  She has urinary incontinence with coughing or sneezing.  Also is complaining of runny nose, sneezing, left clogged ear     Denies new onset headaches, fever, chills, n/v/d, chest pain, abdominal pain,  and lower extremity edema.       All other systems have been reviewed and are negative.    Visit Vitals  BP (!) 150/110   Pulse (!) 136   Ht 1.702 m (5' 7\")   Wt 73.5 kg (162 lb)   BMI 25.37 kg/m²   Smoking Status Never   BSA 1.86 m²       Objective   Physical Exam  General: Alert and oriented. Appears well-nourished and in no acute distress.  Eyes: PERRLA. EOMI.  ENT-known erythema or infection in the external ear canal, clear tympanic membrane. Nose-Septal deviation   Head/neck: Normocephalic. Supple.  Lymphatics: No cervical lymphadenopathy.  Respiratory/Thorax: Clear to auscultation bilaterally. No wheezing.   Cardiovascular: Increased heart rate and rhythm. No murmurs.  Gastrointestinal: Soft, nontender, nondistended. +BS   Musculoskeletal: ROM intact. No joint swelling.  Straight leg testing was positive on the right side.   Extremities: Warm and well perfused. No peripheral edema.  Neurological: No gross neurologic deficits.  Normal sensory, motor " function, strength  Psychological: Patient does not feel depressed but has mood swinging, she had suicidal ideation but does not have any plan.   Skin: No visible rashes or lesions.     Assessment/Plan   She is 34 years old female with past medical history of bipolar disorder who presented today to the office for follow-up after increase the dose of Lamictal from 50 to 100 mg.  (She had some improvement of her mental problems with 50 mg dose that is why we increased the dose to 100 mg).    # bipolar disorder  Patient is having mood swinging, insomnia, hot flashes, racing thoughts, anxiety, decreased her appetite change and 20 to 25 pounds weight loss in the past 6-month.    Have suicidal ideation but no plan or hallucination.   She have tried different medication in the past such as Wellbutrin, Prozac, Lamictal, Abilify.  -Try to lower the dose of Lamictal from 100 to 50 milligram  -A referral was provided for psychiatry, may be this patient will be a good candidate for lithium.    # Palpitation this could be due to thyroid abnormality or part of  anxiety symptoms  She had abnormal TSH in the past. We should exclude the thyroid as origine of her palpitation.   -Ordered TSH      #numbness and tingling of the extremities  -vitamin B 12 level     #Eustachio tube dysfunction  -Patient is having runny nose, ears feel clogged, hard to breath through  the nose for the past month.  She has Azelastine at home but she have not used it recently  -Advised to use Azelastine every day  -Order Flonase  -If the symptoms do not improve in 1 month we will consider referral to ENT doc      #urinary incontinence  -she is , does not do heavy lifting but is overweight which could count as a risk factor  -UA was normal done recently  - advised to do kegel exercise for 4-6 months and if the symptoms persiste with do a referral to  uro-gyn    #back pain  She had back pain which has been going on for some times and is associated with  numbness and tingling of the legs.   Patient stated that she has been diagnosed with bulging disc but in the chart is only x-ray with some degenerative findings  -Physical exam was positive for straight leg test.  -Will reevaluate next visit.  If the symptoms do persist next time we will do PT referral      No red flags. Follow up in 1 month for eustachian tube dysfunction and back pain, or for health maintenance in a year.     Problem List Items Addressed This Visit       Bipolar affective disorder (Multi)    Relevant Orders    Referral to Psychiatry     Other Visit Diagnoses       Eustachian tube dysfunction, right    -  Primary    Relevant Medications    fluticasone (Flonase) 50 mcg/actuation nasal spray    Increased heart rate        Relevant Orders    TSH with reflex to Free T4 if abnormal    Numbness and tingling        Relevant Orders    Vitamin B12            I have personally reviewed all available pertinent labs, imaging, and consult notes with the patient.     All questions and concerns were addressed. Patient verbalizes understanding instructions and agrees with established plan of care.     I discussed the plan with Dr.Shahrimanyan Manju Siu MD  Family medicine resident  PGY2

## 2024-06-19 ENCOUNTER — APPOINTMENT (OUTPATIENT)
Dept: PRIMARY CARE | Facility: CLINIC | Age: 34
End: 2024-06-19
Payer: MEDICAID

## 2024-07-24 ENCOUNTER — APPOINTMENT (OUTPATIENT)
Dept: GASTROENTEROLOGY | Facility: CLINIC | Age: 34
End: 2024-07-24
Payer: MEDICAID

## 2024-07-24 VITALS — BODY MASS INDEX: 25.58 KG/M2 | OXYGEN SATURATION: 99 % | HEART RATE: 87 BPM | HEIGHT: 67 IN | WEIGHT: 163 LBS

## 2024-07-24 DIAGNOSIS — K58.0 IRRITABLE BOWEL SYNDROME WITH DIARRHEA: ICD-10-CM

## 2024-07-24 DIAGNOSIS — R19.7 DIARRHEA, UNSPECIFIED TYPE: Primary | ICD-10-CM

## 2024-07-24 DIAGNOSIS — R11.0 MODERATE NAUSEA: ICD-10-CM

## 2024-07-24 PROCEDURE — 99214 OFFICE O/P EST MOD 30 MIN: CPT | Performed by: INTERNAL MEDICINE

## 2024-07-24 PROCEDURE — 1036F TOBACCO NON-USER: CPT | Performed by: INTERNAL MEDICINE

## 2024-07-24 PROCEDURE — 3008F BODY MASS INDEX DOCD: CPT | Performed by: INTERNAL MEDICINE

## 2024-07-24 NOTE — PROGRESS NOTES
REASON FOR VISIT: Discuss recent diarrhea    HPI:  Evangelina Field is a 34 y.o. 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  Occurring daily.  Felt that she had food poisoning initially.  Has had some nausea with rare vomiting of bile.  No fevers.  No recent travel.  No recent antibiotics.  No sick contacts.  Has had history of adenoma in the past.  No rectal bleeding otherwise.          PRIOR ENDOSCOPY    PAST MEDICAL HISTORY  Past Medical History:   Diagnosis Date    Depression 01/15/2008    Personal history of other diseases of the digestive system 12/02/2015    History of indigestion    Personal history of other diseases of the respiratory system     History of asthma    Personal history of other mental and behavioral disorders     History of anxiety    Personal history of other mental and behavioral disorders     History of bipolar disorder    Personal history of other specified conditions 09/30/2015    History of epigastric pain    Personal history of other specified conditions 08/28/2015    History of nausea    Personal history of other specified conditions 08/18/2014    History of dizziness    Personal history of other specified conditions 08/18/2014    History of fatigue       PAST SURGICAL HISTORY  Past Surgical History:   Procedure Laterality Date    OTHER SURGICAL HISTORY  06/21/2018    Oral Surgery Tooth Extraction [place] [facility]       FAMILY HISTORY  Family History   Problem Relation Name Age of Onset    Graves' disease Mother Evangelina Field     Depression Mother Evangelina Fiedl     Stroke Father Edouard/[place]     Depression Sister Jannet     Depression Sister Izabella     Asthma Brother Peña     Mental illness Brother Alexy        SOCIAL HISTORY  Social History     Tobacco Use    Smoking status: Never    Smokeless tobacco: Never   Substance Use Topics    Alcohol use: Yes     Alcohol/week: 5.0 - 7.0 standard  drinks of alcohol     Types: 5 - 7 Standard drinks or equivalent per week       REVIEW OF SYSTEMS  CONSTITUTIONAL: negative for fever, chills, fatigue, or unintentional weight loss,   HEENT: negative for icteric sclera, eye pain/redness, or changes in vision/hearing  RESPIRATORY: negative for cough, hemoptysis, wheezing, orthopnea, or dyspnea on exertion  CARDIOVASCULAR: negative for chest pain, palpitations, or syncope   GASTROINTESTINAL: as noted per HPI  GENITOURINARY: negative for dysuria, polyuria, incontinence, or hematuria  MUSCULOSKELETAL: negative for arthralgia, myalgia, or joint swelling/stiffness   INTEGUMENTARY/SKIN: negative jaundice, rash, or skin lesion  HEMATOLOGIC/LYMPHATIC: negative for prolonged bleeding, easy bruising, or swollen lymph nodes  ENDOCRINE: negative for cold/heat intolerance, polydipsia, polyuria, or goiter  NEUROLOGIC: negative for headaches, dizziness, tremor, or gait abnormality  PSYCHIATRIC: negative for anxiety, depression, personality changes, or sleep disturbances      A 10 point review of systems was completed and was otherwise negative.    ALLERGIES  Allergies   Allergen Reactions    Amoxicillin Hives    Nitrofurantoin Monohyd/M-Cryst Hives       MEDICATIONS  Current Outpatient Medications   Medication Sig Dispense Refill    albuterol 2.5 mg /3 mL (0.083 %) nebulizer solution USE 1 UNIT DOSE EVERY 4-6 HOURS AS NEEDED FOR WHEEZING .      desipramine (Norpramin) 10 mg tablet TAKE 1 TABLET BY MOUTH EVERY DAY 90 tablet 1    lamoTRIgine (LaMICtal) 25 mg tablet Take 2 tablets (50 mg) by mouth once daily. 180 tablet 0    norgestrel-ethinyl estradioL ([place]ryselle, 28,) 0.3-30 mg-mcg tablet Take 1 tablet by mouth once daily. 84 tablet 3    busPIRone (Buspar) 5 mg tablet Take 1 tablet (5 mg) by mouth 2 times a day. 180 tablet 0    fluticasone (Flonase) 50 mcg/actuation nasal spray Administer 1 spray into each nostril once daily. Shake gently. Before first use, prime pump. After use,  "clean tip and replace cap. 16 g 0     No current facility-administered medications for this visit.       VITALS  Pulse 87   Ht 1.702 m (5' 7\")   Wt 73.9 kg (163 lb)   SpO2 99%   BMI 25.53 kg/m²      PHYSICAL EXAM  Alert oriented in no acute distress    ASSESSMENT/ PLAN  Patient with diarrhea, IBS with recent persistent diarrhea symptoms and nausea.  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  Will check stool studies including C. difficile, fecal calprotectin, stool pathogen profile and ova and parasites.  I will follow-up on stool studies.  If negative will consider trial of Imodium daily.  She is in agreement with the plan.        Signature: Michael Nagy MD    Date: 7/24/2024  Time: 1:56 PM    "

## 2024-08-21 ENCOUNTER — APPOINTMENT (OUTPATIENT)
Dept: GASTROENTEROLOGY | Facility: CLINIC | Age: 34
End: 2024-08-21
Payer: MEDICAID

## 2024-09-23 DIAGNOSIS — F31.32 BIPOLAR AFFECTIVE DISORDER, CURRENTLY DEPRESSED, MODERATE (MULTI): ICD-10-CM

## 2024-09-24 RX ORDER — LAMOTRIGINE 25 MG/1
TABLET ORAL
Qty: 180 TABLET | Refills: 0 | OUTPATIENT
Start: 2024-09-24

## 2024-09-26 ENCOUNTER — PATIENT MESSAGE (OUTPATIENT)
Dept: PRIMARY CARE | Facility: CLINIC | Age: 34
End: 2024-09-26
Payer: MEDICAID

## 2024-09-26 DIAGNOSIS — F31.32 BIPOLAR AFFECTIVE DISORDER, CURRENTLY DEPRESSED, MODERATE (MULTI): ICD-10-CM

## 2024-09-27 RX ORDER — LAMOTRIGINE 25 MG/1
50 TABLET ORAL DAILY
Qty: 60 TABLET | Refills: 0 | Status: SHIPPED | OUTPATIENT
Start: 2024-09-27 | End: 2024-10-27

## 2024-10-04 ENCOUNTER — LAB (OUTPATIENT)
Dept: LAB | Facility: LAB | Age: 34
End: 2024-10-04
Payer: MEDICAID

## 2024-10-04 DIAGNOSIS — R19.7 DIARRHEA, UNSPECIFIED TYPE: ICD-10-CM

## 2024-10-04 PROCEDURE — 87329 GIARDIA AG IA: CPT

## 2024-10-04 PROCEDURE — 83993 ASSAY FOR CALPROTECTIN FECAL: CPT

## 2024-10-04 PROCEDURE — 87328 CRYPTOSPORIDIUM AG IA: CPT

## 2024-10-08 ENCOUNTER — APPOINTMENT (OUTPATIENT)
Dept: PRIMARY CARE | Facility: CLINIC | Age: 34
End: 2024-10-08
Payer: MEDICAID

## 2024-10-08 VITALS
BODY MASS INDEX: 26.06 KG/M2 | RESPIRATION RATE: 16 BRPM | DIASTOLIC BLOOD PRESSURE: 92 MMHG | HEART RATE: 124 BPM | TEMPERATURE: 97.7 F | OXYGEN SATURATION: 99 % | SYSTOLIC BLOOD PRESSURE: 136 MMHG | HEIGHT: 67 IN | WEIGHT: 166 LBS

## 2024-10-08 DIAGNOSIS — M25.78 OSTEOPHYTE OF VERTEBRAE: ICD-10-CM

## 2024-10-08 DIAGNOSIS — I10 PRIMARY HYPERTENSION: ICD-10-CM

## 2024-10-08 DIAGNOSIS — E53.8 VITAMIN B12 DEFICIENCY: ICD-10-CM

## 2024-10-08 DIAGNOSIS — R68.89 IMPAIRED REGULATION OF BODY TEMPERATURE: ICD-10-CM

## 2024-10-08 DIAGNOSIS — E34.9 HORMONE IMBALANCE: Primary | ICD-10-CM

## 2024-10-08 PROBLEM — J45.20 MILD INTERMITTENT ASTHMA: Status: ACTIVE | Noted: 2024-10-08

## 2024-10-08 PROBLEM — R49.0 HOARSENESS: Status: RESOLVED | Noted: 2024-10-08 | Resolved: 2024-10-08

## 2024-10-08 PROBLEM — F31.32 BIPOLAR DISORDER, CURRENT EPISODE DEPRESSED, MODERATE (MULTI): Status: ACTIVE | Noted: 2024-10-08

## 2024-10-08 PROBLEM — B96.89 BACTERIAL UPPER RESPIRATORY INFECTION: Status: RESOLVED | Noted: 2024-10-08 | Resolved: 2024-10-08

## 2024-10-08 PROBLEM — N39.0 URINARY TRACT INFECTION: Status: RESOLVED | Noted: 2024-10-08 | Resolved: 2024-10-08

## 2024-10-08 PROBLEM — B96.89 BACTERIAL VAGINOSIS: Status: RESOLVED | Noted: 2024-10-08 | Resolved: 2024-10-08

## 2024-10-08 PROBLEM — J06.9 BACTERIAL UPPER RESPIRATORY INFECTION: Status: RESOLVED | Noted: 2024-10-08 | Resolved: 2024-10-08

## 2024-10-08 PROBLEM — N76.0 BACTERIAL VAGINOSIS: Status: RESOLVED | Noted: 2024-10-08 | Resolved: 2024-10-08

## 2024-10-08 PROBLEM — Z86.59 HISTORY OF MANIC DEPRESSIVE DISORDER: Status: RESOLVED | Noted: 2024-10-08 | Resolved: 2024-10-08

## 2024-10-08 LAB
CALPROTECTIN STL-MCNT: 14 UG/G
CRYPTOSP AG STL QL IA: NEGATIVE
G LAMBLIA AG STL QL IA: NEGATIVE

## 2024-10-08 PROCEDURE — 99214 OFFICE O/P EST MOD 30 MIN: CPT

## 2024-10-08 PROCEDURE — 3075F SYST BP GE 130 - 139MM HG: CPT

## 2024-10-08 PROCEDURE — 1036F TOBACCO NON-USER: CPT

## 2024-10-08 PROCEDURE — 3008F BODY MASS INDEX DOCD: CPT

## 2024-10-08 PROCEDURE — 3080F DIAST BP >= 90 MM HG: CPT

## 2024-10-08 RX ORDER — PROPRANOLOL HYDROCHLORIDE 20 MG/1
20 TABLET ORAL 2 TIMES DAILY
Qty: 60 TABLET | Refills: 5 | Status: SHIPPED | OUTPATIENT
Start: 2024-10-08 | End: 2025-04-06

## 2024-10-08 SDOH — ECONOMIC STABILITY: FOOD INSECURITY: WITHIN THE PAST 12 MONTHS, YOU WORRIED THAT YOUR FOOD WOULD RUN OUT BEFORE YOU GOT MONEY TO BUY MORE.: NEVER TRUE

## 2024-10-08 SDOH — ECONOMIC STABILITY: FOOD INSECURITY: WITHIN THE PAST 12 MONTHS, THE FOOD YOU BOUGHT JUST DIDN'T LAST AND YOU DIDN'T HAVE MONEY TO GET MORE.: NEVER TRUE

## 2024-10-08 ASSESSMENT — ENCOUNTER SYMPTOMS
NUMBNESS: 1
OCCASIONAL FEELINGS OF UNSTEADINESS: 1
GASTROINTESTINAL NEGATIVE: 1
BACK PAIN: 1
LOSS OF SENSATION IN FEET: 0
SLEEP DISTURBANCE: 1
CARDIOVASCULAR NEGATIVE: 1
BRUISES/BLEEDS EASILY: 1
RESPIRATORY NEGATIVE: 1
DEPRESSION: 0
CONSTITUTIONAL NEGATIVE: 1

## 2024-10-08 ASSESSMENT — PAIN SCALES - GENERAL: PAINLEVEL: 3

## 2024-10-08 ASSESSMENT — PATIENT HEALTH QUESTIONNAIRE - PHQ9
SUM OF ALL RESPONSES TO PHQ9 QUESTIONS 1 & 2: 2
2. FEELING DOWN, DEPRESSED OR HOPELESS: SEVERAL DAYS
10. IF YOU CHECKED OFF ANY PROBLEMS, HOW DIFFICULT HAVE THESE PROBLEMS MADE IT FOR YOU TO DO YOUR WORK, TAKE CARE OF THINGS AT HOME, OR GET ALONG WITH OTHER PEOPLE: SOMEWHAT DIFFICULT
1. LITTLE INTEREST OR PLEASURE IN DOING THINGS: SEVERAL DAYS

## 2024-10-08 ASSESSMENT — LIFESTYLE VARIABLES
HOW OFTEN DO YOU HAVE SIX OR MORE DRINKS ON ONE OCCASION: NEVER
SKIP TO QUESTIONS 9-10: 1
AUDIT-C TOTAL SCORE: 4
HOW OFTEN DO YOU HAVE A DRINK CONTAINING ALCOHOL: 4 OR MORE TIMES A WEEK
HOW MANY STANDARD DRINKS CONTAINING ALCOHOL DO YOU HAVE ON A TYPICAL DAY: 1 OR 2

## 2024-10-08 NOTE — PATIENT INSTRUCTIONS
Thank you for coming to see me today.  If you have any questions or concerns following our visit, please contact the office.  Phone: [phone]    Follow up with me in 3 months    1)  Magnesium Glycinate can help you fall asleep and stay asleep. You can purchase this on Amazon, at [employer], [employer] and most other drug stores. Take 1000 mg of this nightly about 60-90 minutes before you go to sleep.     2) Start taking 5000 mcg of vitamin B12 for your low serum level. I have placed an order to check your vitamin b12 again in 6 weeks (on or after 11/19)    3) Monitor your blood pressure at home after starting your blood pressure medication. Make sure you have waited about an hour to an hour and a half before checking your blood pressure, and that you have been sitting for at least 5 minutes before taking the reading.    4) Use flonase daily to help with the pressure in your ears. When you feel an increase in the pressure, it is safe to use twice daily for short periods of time (10 days), and then go back to using it once daily        Ways to Help Prevent Falls at Home    Quick Tips   ? Ask for help if you need it. Most people want to help!   ? Get up slowly after sitting or laying down   ? Wear a medical alert device or keep cell phone in your pocket   ? Use night lights, especially areas near a bathroom   ? Keep the items you use often within reach on a small stool or end table   ? Use an assistive device such as walker or cane, as directed by provider/physical therapy   ? Use a non-slip mat and grab bars in your bathroom. Look for home health sections for best options     Other Areas to Focus On   ? Exercise and nutrition: Regular exercise or taking a falls prevention class are great ways improve strength and balance. Don’t forget to stay hydrated and bring a snack!   ? Medicine side effects: Some medicines can make you sleepy or dizzy, which could cause a fall. Ask your healthcare provider about the side effects  your medicines could cause. Be sure to let them know if you take any vitamins or supplements as well.   ? Tripping hazards: Remove items you could trip on, such as loose mats, rugs, cords, and clutter. Wear closed toe shoes with rubber soles.   ? Health and wellness: Get regular checkups with your healthcare provider, plus routine vision and hearing screenings. Talk with your healthcare provider about:   o Your medicines and the possible side effects - bring them in a bag if that is easier!   o Problems with balance or feeling dizzy   o Ways to promote bone health, such as Vitamin D and calcium supplements   o Questions or concerns about falling     *Ask your healthcare team if you have questions     ©[facility], 2022

## 2024-10-08 NOTE — PROGRESS NOTES
Subjective   Patient ID: Evangelina Noriega is a 34 y.o. female who presents for New Patient Visit (New to office, to establish. Weakened , has difficulty holding things. ), Hot Flashes (Recurring hot flashes and chills,X 8 months.), Insomnia (Sleeps 4-5 hrs per night.), and Pain (Joint and nerve pain, normally lower back and legs. 6/10. Occurs with tingling and numbness. ).    HPI   Evangelina presents as a new patient visit to establish care.     Hot flashes/chills: She endorses that she feels like she has difficulty with body temperature regulation. She is agreeable to having labs completed to check her hormones, and with starting propranolol to help decrease her BP and her heart rate. She states that she checks her BP at home with a machine, and it normally runs elevated, which seems to be about the time frame that she feels like her temperature is elevated as well. T3 and T4 labs also ordered.     Insomnia: she is agreeable to trying magnesium glycinate to help her sleep. We discussed that this should be taken 60-90 minutes before she wants to fall asleep.     [id number] deficiency: Evangelina is to start taking 1000 mcg of vitamin b12 daily from her deficient lab result from June. Iron levels and CBC to be checked as well- she states that she has increased bruising, and we discussed that this could be a result from vitamin b12 deficiency, as well as the numbness and tingling that she is experiencing in her extremities.    Back pain: multiple bone spurs found in imaging from 5/2024- referral placed for ortho    Follow up as scheduled, and sooner as needed. Will reach out with lab results.     Review of Systems   Constitutional: Negative.    HENT: Negative.     Respiratory: Negative.     Cardiovascular: Negative.    Gastrointestinal: Negative.    Genitourinary: Negative.    Musculoskeletal:  Positive for back pain.   Skin: Negative.    Allergic/Immunologic: Positive for environmental allergies.   Neurological:   "Positive for numbness.   Hematological:  Bruises/bleeds easily.   Psychiatric/Behavioral:  Positive for sleep disturbance.        Objective   BP (!) 136/92 (BP Location: Left arm, Patient Position: Sitting, BP Cuff Size: Adult)   Pulse (!) 124   Temp 36.5 °C (97.7 °F)   Resp 16   Ht 1.689 m (5' 6.5\")   Wt 75.3 kg (166 lb)   SpO2 99%   BMI 26.39 kg/m²     Physical Exam    Assessment/Plan   Problem List Items Addressed This Visit             ICD-10-CM    Primary hypertension I10    Relevant Medications    propranolol (Inderal) 20 mg tablet    Osteophyte of vertebrae M25.78    Relevant Orders    Referral to Orthopaedic Surgery    Vitamin B12 deficiency E53.8    Relevant Orders    Vitamin B12    Iron and TIBC (Completed)    Impaired regulation of body temperature R68.89    Relevant Orders    Estrone    Estradiol (Completed)    Testosterone,Free and Total    T3, free (Completed)    T4, free (Completed)    Vitamin D 25-Hydroxy,Total (for eval of Vitamin D levels) (Completed)    Hormone imbalance - Primary E34.9        Patient was identified as a fall risk. Risk prevention instructions provided.  "

## 2024-10-09 ENCOUNTER — LAB (OUTPATIENT)
Dept: LAB | Facility: LAB | Age: 34
End: 2024-10-09
Payer: MEDICAID

## 2024-10-09 DIAGNOSIS — R68.89 IMPAIRED REGULATION OF BODY TEMPERATURE: ICD-10-CM

## 2024-10-09 DIAGNOSIS — E53.8 VITAMIN B12 DEFICIENCY: ICD-10-CM

## 2024-10-09 LAB
25(OH)D3 SERPL-MCNC: 31 NG/ML (ref 30–100)
ESTRADIOL SERPL-MCNC: <19 PG/ML
IRON SATN MFR SERPL: 50 % (ref 25–45)
IRON SERPL-MCNC: 166 UG/DL (ref 35–150)
T3FREE SERPL-MCNC: 4.1 PG/ML (ref 2.3–4.2)
T4 FREE SERPL-MCNC: 0.93 NG/DL (ref 0.61–1.12)
TIBC SERPL-MCNC: 329 UG/DL (ref 240–445)
UIBC SERPL-MCNC: 163 UG/DL (ref 110–370)

## 2024-10-09 PROCEDURE — 83540 ASSAY OF IRON: CPT

## 2024-10-09 PROCEDURE — 84481 FREE ASSAY (FT-3): CPT

## 2024-10-09 PROCEDURE — 84439 ASSAY OF FREE THYROXINE: CPT

## 2024-10-09 PROCEDURE — 82679 ASSAY OF ESTRONE: CPT

## 2024-10-09 PROCEDURE — 84402 ASSAY OF FREE TESTOSTERONE: CPT

## 2024-10-09 PROCEDURE — 82306 VITAMIN D 25 HYDROXY: CPT

## 2024-10-09 PROCEDURE — 83550 IRON BINDING TEST: CPT

## 2024-10-09 PROCEDURE — 82670 ASSAY OF TOTAL ESTRADIOL: CPT

## 2024-10-09 PROCEDURE — 36415 COLL VENOUS BLD VENIPUNCTURE: CPT

## 2024-10-10 PROBLEM — E34.9 HORMONE IMBALANCE: Status: ACTIVE | Noted: 2024-10-10

## 2024-10-11 LAB — O+P STL MICRO: NEGATIVE

## 2024-10-12 LAB — ESTRONE SERPL-MCNC: 19 PG/ML

## 2024-10-13 LAB
TESTOSTERONE FREE (CHAN): 1.2 PG/ML (ref 0.1–6.4)
TESTOSTERONE,TOTAL,LC-MS/MS: 21 NG/DL (ref 2–45)

## 2024-10-20 DIAGNOSIS — K58.0 IRRITABLE BOWEL SYNDROME WITH DIARRHEA: ICD-10-CM

## 2024-10-21 RX ORDER — DESIPRAMINE HYDROCHLORIDE 10 MG/1
10 TABLET ORAL DAILY
Qty: 90 TABLET | Refills: 1 | Status: SHIPPED | OUTPATIENT
Start: 2024-10-21

## 2024-12-02 ASSESSMENT — ENCOUNTER SYMPTOMS
PND: 1
NECK PAIN: 0
SWEATS: 1
SHORTNESS OF BREATH: 0
HEADACHES: 0
ORTHOPNEA: 1
PALPITATIONS: 0
HYPERTENSION: 1
BLURRED VISION: 0

## 2024-12-06 ENCOUNTER — LAB (OUTPATIENT)
Dept: LAB | Facility: LAB | Age: 34
End: 2024-12-06
Payer: MEDICAID

## 2024-12-06 DIAGNOSIS — E53.8 VITAMIN B12 DEFICIENCY: ICD-10-CM

## 2024-12-06 LAB — VIT B12 SERPL-MCNC: 365 PG/ML (ref 211–911)

## 2024-12-06 PROCEDURE — 36415 COLL VENOUS BLD VENIPUNCTURE: CPT

## 2024-12-06 PROCEDURE — 82607 VITAMIN B-12: CPT

## 2024-12-07 ENCOUNTER — OFFICE VISIT (OUTPATIENT)
Dept: URGENT CARE | Age: 34
End: 2024-12-07
Payer: MEDICAID

## 2024-12-07 VITALS — SYSTOLIC BLOOD PRESSURE: 113 MMHG | DIASTOLIC BLOOD PRESSURE: 66 MMHG | OXYGEN SATURATION: 98 % | HEART RATE: 82 BPM

## 2024-12-07 DIAGNOSIS — R39.15 URGENCY OF URINATION: ICD-10-CM

## 2024-12-07 DIAGNOSIS — N30.01 ACUTE CYSTITIS WITH HEMATURIA: ICD-10-CM

## 2024-12-07 DIAGNOSIS — R35.0 URINARY FREQUENCY: Primary | ICD-10-CM

## 2024-12-07 DIAGNOSIS — R30.0 DYSURIA: ICD-10-CM

## 2024-12-07 LAB
POC APPEARANCE, URINE: ABNORMAL
POC BILIRUBIN, URINE: NEGATIVE
POC BLOOD, URINE: ABNORMAL
POC COLOR, URINE: ABNORMAL
POC GLUCOSE, URINE: NEGATIVE MG/DL
POC KETONES, URINE: NEGATIVE MG/DL
POC LEUKOCYTES, URINE: ABNORMAL
POC NITRITE,URINE: NEGATIVE
POC PH, URINE: 6 PH
POC PROTEIN, URINE: NEGATIVE MG/DL
POC SPECIFIC GRAVITY, URINE: 1.01
POC UROBILINOGEN, URINE: 0.2 EU/DL

## 2024-12-07 PROCEDURE — 87086 URINE CULTURE/COLONY COUNT: CPT

## 2024-12-07 RX ORDER — PHENAZOPYRIDINE HYDROCHLORIDE 200 MG/1
200 TABLET, FILM COATED ORAL 3 TIMES DAILY PRN
Qty: 9 TABLET | Refills: 0 | Status: SHIPPED | OUTPATIENT
Start: 2024-12-07 | End: 2024-12-10

## 2024-12-07 RX ORDER — CEPHALEXIN 500 MG/1
500 CAPSULE ORAL 2 TIMES DAILY
Qty: 14 CAPSULE | Refills: 0 | Status: SHIPPED | OUTPATIENT
Start: 2024-12-07 | End: 2024-12-14

## 2024-12-07 ASSESSMENT — ENCOUNTER SYMPTOMS
BACK PAIN: 1
ABDOMINAL PAIN: 1
DYSURIA: 1
HEADACHES: 0
FREQUENCY: 1
VOMITING: 0
SORE THROAT: 0
CHILLS: 0
NAUSEA: 1
CONSTIPATION: 0
FEVER: 0
DIARRHEA: 1
ANOREXIA: 0
FLANK PAIN: 0
HEMATURIA: 0

## 2024-12-07 NOTE — PROGRESS NOTES
Subjective   Patient ID: Evangelina Noriega is a 34 y.o. female. They present today with a chief complaint of Urinary Frequency (C/O of urinary frequency issues and feeling like she is not emptying completely.  ).    History of Present Illness  Patient is here to be checked for urinary tract infection.  She had dysuria frequency urgency and bladder pressure for the last 2 days.  She is unsure about hematuria because she is on her menstrual cycle      History provided by:  Patient   used: No    Urinary Frequency  Associated symptoms: abdominal pain, diarrhea and nausea    Associated symptoms: no fever, no headaches, no rash, no sore throat and no vomiting    Female  Problem  The patient's primary symptoms include pelvic pain and vaginal bleeding. The patient's pertinent negatives include no genital itching, genital lesions, genital odor, genital rash, missed menses or vaginal discharge. This is a recurrent problem. The current episode started in the past 7 days. The problem occurs intermittently. The problem has been waxing and waning. The pain is moderate. The problem affects both sides. She is not pregnant. Associated symptoms include abdominal pain, back pain, diarrhea, dysuria, frequency, nausea and urgency. Pertinent negatives include no anorexia, chills, constipation, discolored urine, fever, flank pain, headaches, hematuria, joint pain, joint swelling, painful intercourse, rash, sore throat or vomiting. The vaginal bleeding is typical of menses. She has not been passing clots. She has not been passing tissue. The symptoms are aggravated by activity, tactile pressure and urinating. She is sexually active. No, her partner does not have an STD. She uses oral contraceptives for contraception. Her menstrual history has been regular.       Past Medical History  Allergies as of 12/07/2024 - Reviewed 12/07/2024   Allergen Reaction Noted    Amoxicillin Hives 09/04/2023    Nitrofurantoin  monohyd/m-cryst Hives 09/04/2023       (Not in a hospital admission)       Past Medical History:   Diagnosis Date    Bacterial upper respiratory infection 10/08/2024    Bacterial vaginosis 10/08/2024    Depression 01/15/2008    History of manic depressive disorder 10/08/2024    Personal history of other diseases of the digestive system 12/02/2015    History of indigestion    Personal history of other diseases of the respiratory system     History of asthma    Personal history of other mental and behavioral disorders     History of anxiety    Personal history of other mental and behavioral disorders     History of bipolar disorder    Personal history of other specified conditions 09/30/2015    History of epigastric pain    Personal history of other specified conditions 08/28/2015    History of nausea    Personal history of other specified conditions 08/18/2014    History of dizziness    Personal history of other specified conditions 08/18/2014    History of fatigue    Urinary tract infection 10/08/2024       Past Surgical History:   Procedure Laterality Date    OTHER SURGICAL HISTORY  06/21/2018    Oral Surgery Tooth Extraction [place] [facility]        reports that she has never smoked. She has never been exposed to tobacco smoke. She has never used smokeless tobacco. She reports current alcohol use of about 5.0 - 7.0 standard drinks of alcohol per week. She reports that she does not currently use drugs after having used the following drugs: Cocaine and Marijuana.    Review of Systems  Review of Systems   Constitutional:  Negative for chills and fever.   HENT:  Negative for sore throat.    Gastrointestinal:  Positive for abdominal pain, diarrhea and nausea. Negative for anorexia, constipation and vomiting.   Genitourinary:  Positive for dysuria, frequency, pelvic pain and urgency. Negative for flank pain, hematuria, missed menses and vaginal discharge.   Musculoskeletal:  Positive for back pain. Negative for joint pain.    Skin:  Negative for rash.   Neurological:  Negative for headaches.                                  Objective    Vitals:    12/07/24 1130   BP: 113/66   BP Location: Left arm   Patient Position: Sitting   BP Cuff Size: Adult   Pulse: 82   SpO2: 98%     No LMP recorded.    Physical Exam  Vitals and nursing note reviewed.   Constitutional:       Appearance: Normal appearance.      Comments: Pleasant talkative 34-year-old female in no acute distress   HENT:      Head: Normocephalic and atraumatic.      Nose: Nose normal.   Cardiovascular:      Rate and Rhythm: Normal rate.   Pulmonary:      Effort: Pulmonary effort is normal. No respiratory distress.   Abdominal:      General: There is no distension.      Palpations: Abdomen is soft.      Tenderness: There is no abdominal tenderness. There is no right CVA tenderness or left CVA tenderness.   Skin:     General: Skin is warm and dry.   Neurological:      General: No focal deficit present.      Mental Status: She is alert and oriented to person, place, and time.   Psychiatric:         Mood and Affect: Mood normal.         Behavior: Behavior normal.         Procedures    Point of Care Test & Imaging Results from this visit  Results for orders placed or performed in visit on 12/07/24   POCT UA Automated manually resulted   Result Value Ref Range    POC Color, Urine Light-Yellow Straw, Yellow, Light-Yellow    POC Appearance, Urine Cloudy (A) Clear    POC Glucose, Urine NEGATIVE NEGATIVE mg/dl    POC Bilirubin, Urine NEGATIVE NEGATIVE    POC Ketones, Urine NEGATIVE NEGATIVE mg/dl    POC Specific Gravity, Urine 1.010 1.005 - 1.035    POC Blood, Urine LARGE (3+) (A) NEGATIVE    POC PH, Urine 6.0 No Reference Range Established PH    POC Protein, Urine NEGATIVE NEGATIVE, 30 (1+) mg/dl    POC Urobilinogen, Urine 0.2 0.2, 1.0 EU/DL    Poc Nitrite, Urine NEGATIVE NEGATIVE    POC Leukocytes, Urine SMALL (1+) (A) NEGATIVE      No results found.    Diagnostic study results (if any)  were reviewed by [facility].    Assessment/Plan   Allergies, medications, history, and pertinent labs/EKGs/Imaging reviewed by Beth Montague PA-C.     Medical Decision Making  History and physical exam coupled with urinalysis are consistent with a urinary tract infection.  She feels she has had UTIs in the past.  She is allergic to Macrobid and amoxicillin, both caused hives.  In the past she has taken Cipro but voices concerns about the side effects, potential tendon issues.  Will try Keflex.  We discussed reasons to return to urgent care, such as no improvement in her symptoms.  We discussed reasons to go to the emergency department such as flank pain fever chills nausea vomiting.  She tells me she has had a kidney infection in the past.  Urine culture will be collected.    Orders and Diagnoses  Diagnoses and all orders for this visit:  Urinary frequency  -     POCT UA Automated manually resulted  -     Urine Culture  Acute cystitis with hematuria  -     cephalexin (Keflex) 500 mg capsule; Take 1 capsule (500 mg) by mouth 2 times a day for 7 days.      Medical Admin Record      Patient disposition: Home    Electronically signed by [facility]  11:56 AM

## 2024-12-09 ENCOUNTER — APPOINTMENT (OUTPATIENT)
Dept: PRIMARY CARE | Facility: CLINIC | Age: 34
End: 2024-12-09
Payer: MEDICAID

## 2024-12-09 VITALS
OXYGEN SATURATION: 100 % | HEIGHT: 65 IN | RESPIRATION RATE: 15 BRPM | BODY MASS INDEX: 29.24 KG/M2 | WEIGHT: 175.5 LBS | SYSTOLIC BLOOD PRESSURE: 109 MMHG | DIASTOLIC BLOOD PRESSURE: 74 MMHG | HEART RATE: 57 BPM | TEMPERATURE: 91 F

## 2024-12-09 DIAGNOSIS — F51.01 PRIMARY INSOMNIA: ICD-10-CM

## 2024-12-09 DIAGNOSIS — R06.83 SNORING: ICD-10-CM

## 2024-12-09 DIAGNOSIS — Z23 NEED FOR VACCINATION FOR H FLU TYPE B: Primary | ICD-10-CM

## 2024-12-09 DIAGNOSIS — B37.31 VAGINAL CANDIDIASIS: ICD-10-CM

## 2024-12-09 DIAGNOSIS — K21.9 GASTROESOPHAGEAL REFLUX DISEASE WITHOUT ESOPHAGITIS: ICD-10-CM

## 2024-12-09 PROCEDURE — 3078F DIAST BP <80 MM HG: CPT

## 2024-12-09 PROCEDURE — 3008F BODY MASS INDEX DOCD: CPT

## 2024-12-09 PROCEDURE — 99214 OFFICE O/P EST MOD 30 MIN: CPT

## 2024-12-09 PROCEDURE — 1036F TOBACCO NON-USER: CPT

## 2024-12-09 PROCEDURE — 3074F SYST BP LT 130 MM HG: CPT

## 2024-12-09 PROCEDURE — 90471 IMMUNIZATION ADMIN: CPT

## 2024-12-09 PROCEDURE — 90656 IIV3 VACC NO PRSV 0.5 ML IM: CPT

## 2024-12-09 RX ORDER — TRAZODONE HYDROCHLORIDE 50 MG/1
50 TABLET ORAL NIGHTLY PRN
Qty: 30 TABLET | Refills: 5 | Status: SHIPPED | OUTPATIENT
Start: 2024-12-09 | End: 2025-12-09

## 2024-12-09 RX ORDER — FLUCONAZOLE 150 MG/1
150 TABLET ORAL ONCE
Qty: 1 TABLET | Refills: 0 | Status: SHIPPED | OUTPATIENT
Start: 2024-12-09 | End: 2024-12-09

## 2024-12-09 RX ORDER — FAMOTIDINE 20 MG/1
20 TABLET, FILM COATED ORAL 2 TIMES DAILY
Qty: 60 TABLET | Refills: 5 | Status: SHIPPED | OUTPATIENT
Start: 2024-12-09 | End: 2025-06-07

## 2024-12-09 SDOH — ECONOMIC STABILITY: FOOD INSECURITY: WITHIN THE PAST 12 MONTHS, THE FOOD YOU BOUGHT JUST DIDN'T LAST AND YOU DIDN'T HAVE MONEY TO GET MORE.: NEVER TRUE

## 2024-12-09 SDOH — ECONOMIC STABILITY: FOOD INSECURITY: WITHIN THE PAST 12 MONTHS, YOU WORRIED THAT YOUR FOOD WOULD RUN OUT BEFORE YOU GOT MONEY TO BUY MORE.: NEVER TRUE

## 2024-12-09 ASSESSMENT — ANXIETY QUESTIONNAIRES
6. BECOMING EASILY ANNOYED OR IRRITABLE: MORE THAN HALF THE DAYS
3. WORRYING TOO MUCH ABOUT DIFFERENT THINGS: NOT AT ALL
IF YOU CHECKED OFF ANY PROBLEMS ON THIS QUESTIONNAIRE, HOW DIFFICULT HAVE THESE PROBLEMS MADE IT FOR YOU TO DO YOUR WORK, TAKE CARE OF THINGS AT HOME, OR GET ALONG WITH OTHER PEOPLE: SOMEWHAT DIFFICULT
GAD7 TOTAL SCORE: 10
5. BEING SO RESTLESS THAT IT IS HARD TO SIT STILL: NEARLY EVERY DAY
7. FEELING AFRAID AS IF SOMETHING AWFUL MIGHT HAPPEN: NOT AT ALL
2. NOT BEING ABLE TO STOP OR CONTROL WORRYING: SEVERAL DAYS
4. TROUBLE RELAXING: NEARLY EVERY DAY
1. FEELING NERVOUS, ANXIOUS, OR ON EDGE: SEVERAL DAYS

## 2024-12-09 ASSESSMENT — PAIN SCALES - GENERAL: PAINLEVEL_OUTOF10: 0-NO PAIN

## 2024-12-09 ASSESSMENT — LIFESTYLE VARIABLES
AUDIT-C TOTAL SCORE: 1
SKIP TO QUESTIONS 9-10: 1
HOW MANY STANDARD DRINKS CONTAINING ALCOHOL DO YOU HAVE ON A TYPICAL DAY: 1 OR 2
HOW OFTEN DO YOU HAVE A DRINK CONTAINING ALCOHOL: MONTHLY OR LESS
HOW OFTEN DO YOU HAVE SIX OR MORE DRINKS ON ONE OCCASION: NEVER

## 2024-12-09 ASSESSMENT — ENCOUNTER SYMPTOMS
HYPERTENSION: 1
SWEATS: 1
PND: 1
PALPITATIONS: 0
BLURRED VISION: 0
NECK PAIN: 0
HEADACHES: 0
SHORTNESS OF BREATH: 0
ORTHOPNEA: 1

## 2024-12-09 ASSESSMENT — PATIENT HEALTH QUESTIONNAIRE - PHQ9
1. LITTLE INTEREST OR PLEASURE IN DOING THINGS: NOT AT ALL
2. FEELING DOWN, DEPRESSED OR HOPELESS: NOT AT ALL
SUM OF ALL RESPONSES TO PHQ9 QUESTIONS 1 & 2: 0

## 2024-12-09 NOTE — PATIENT INSTRUCTIONS
Thank you for coming to see me today.  If you have any questions or concerns following our visit, please contact the office.  Phone: [phone]    Follow up with me in 2 months    1) I sent a prescription for famotidine to your pharmacy to see if it helps with your flushing after eating    2) I have also sent in a prescription for trazodone to help you sleep. You can start with 25 mg (1/2 tablet) and see if that helps and increase to 50 mg if needed.       Ways to Help Prevent Falls at Home    Quick Tips   ? Ask for help if you need it. Most people want to help!   ? Get up slowly after sitting or laying down   ? Wear a medical alert device or keep cell phone in your pocket   ? Use night lights, especially areas near a bathroom   ? Keep the items you use often within reach on a small stool or end table   ? Use an assistive device such as walker or cane, as directed by provider/physical therapy   ? Use a non-slip mat and grab bars in your bathroom. Look for home health sections for best options     Other Areas to Focus On   ? Exercise and nutrition: Regular exercise or taking a falls prevention class are great ways improve strength and balance. Don’t forget to stay hydrated and bring a snack!   ? Medicine side effects: Some medicines can make you sleepy or dizzy, which could cause a fall. Ask your healthcare provider about the side effects your medicines could cause. Be sure to let them know if you take any vitamins or supplements as well.   ? Tripping hazards: Remove items you could trip on, such as loose mats, rugs, cords, and clutter. Wear closed toe shoes with rubber soles.   ? Health and wellness: Get regular checkups with your healthcare provider, plus routine vision and hearing screenings. Talk with your healthcare provider about:   o Your medicines and the possible side effects - bring them in a bag if that is easier!   o Problems with balance or feeling dizzy   o Ways to promote bone health, such as  Vitamin D and calcium supplements   o Questions or concerns about falling     *Ask your healthcare team if you have questions     ©[facility], 2022

## 2024-12-09 NOTE — PROGRESS NOTES
Subjective   Patient ID: Evangelina Noriega is a 34 y.o. female who presents for Follow-up (2 month follow up, trouble sleeping, at night both hands cramp up.. pt wants flu vaccine.).    Hypertension  This is a chronic problem. The current episode started more than 1 month ago. The problem has been rapidly improving since onset. The problem is controlled. Associated symptoms include anxiety, malaise/fatigue, orthopnea, PND and sweats. Pertinent negatives include no blurred vision, chest pain, headaches, neck pain, palpitations, peripheral edema or shortness of breath. Agents associated with hypertension include oral contraceptives. Risk factors for coronary artery disease include family history. There are no compliance problems.       Evangelina presents for a follow up. She endorses that the propranolol has helped with her blood pressure and anxiety- continue as prescribed.  She states that she still feels flushed sometimes, and that it mostly happens after eating. She states that this happens after every time she eats, no matter what she eats. She is agreeable to trying famotidine prior to eating to see if that helps prevent her symptoms.  She also endorses that she is still having a difficult time sleeping- she did try the magnesium glycinate as we discussed last time and it did not provide her any relief. She is agreeable to trying trazodone- we discussed trying 1/2 tablet (25 mg) at first and increasing to 50 mg if she needs to.  Evangelina endorses that she sometimes wakes up feeling like she's gasping for air and is concerned that she may have sleep apnea. She states that she does snore- home sleep apnea test ordered to be completed.   Follow up in 2 months to reassess.     Review of Systems   Constitutional:  Positive for malaise/fatigue.   Eyes:  Negative for blurred vision.   Respiratory:  Negative for shortness of breath.    Cardiovascular:  Positive for orthopnea and PND. Negative for chest pain and palpitations.  "  Musculoskeletal:  Negative for neck pain.   Neurological:  Negative for headaches.       Objective   /74 (BP Location: Right arm, Patient Position: Sitting, BP Cuff Size: Large adult)   Pulse 57   Temp (!) 32.8 °C (91 °F) (Temporal)   Resp 15   Ht 1.651 m (5' 5\")   Wt 79.6 kg (175 lb 8 oz)   SpO2 100%   BMI 29.20 kg/m²     Physical Exam  Cardiovascular:      Rate and Rhythm: Normal rate and regular rhythm.      Pulses: Normal pulses.      Heart sounds: Normal heart sounds.   Pulmonary:      Effort: Pulmonary effort is normal.      Breath sounds: Normal breath sounds.   Skin:     Capillary Refill: Capillary refill takes less than 2 seconds.   Neurological:      Mental Status: She is oriented to person, place, and time.   Psychiatric:         Mood and Affect: Mood normal.         Behavior: Behavior normal.         Thought Content: Thought content normal.         Judgment: Judgment normal.         Assessment/Plan   Problem List Items Addressed This Visit             ICD-10-CM    GERD (gastroesophageal reflux disease) K21.9    Relevant Medications    famotidine (Pepcid) 20 mg tablet     Other Visit Diagnoses         Codes    Need for vaccination for H flu type B    -  Primary Z23    Relevant Orders    Flu vaccine, trivalent, preservative free, age 6 months and greater (Fluarix/Fluzone/Flulaval) (Completed)    Snoring     R06.83    Relevant Orders    Home sleep apnea test (HSAT)    Primary insomnia     F51.01    Relevant Medications    traZODone (Desyrel) 50 mg tablet    Vaginal candidiasis     B37.31    Relevant Medications    fluconazole (Diflucan) 150 mg tablet                       Patient was identified as a fall risk. Risk prevention instructions provided.  "

## 2024-12-10 LAB — BACTERIA UR CULT: ABNORMAL

## 2024-12-13 ENCOUNTER — TRANSCRIBE ORDERS (OUTPATIENT)
Dept: ORTHOPEDIC SURGERY | Facility: HOSPITAL | Age: 34
End: 2024-12-13
Payer: MEDICAID

## 2024-12-13 DIAGNOSIS — M54.50 LOW BACK PAIN, UNSPECIFIED BACK PAIN LATERALITY, UNSPECIFIED CHRONICITY, UNSPECIFIED WHETHER SCIATICA PRESENT: ICD-10-CM

## 2024-12-16 ENCOUNTER — HOSPITAL ENCOUNTER (OUTPATIENT)
Dept: RADIOLOGY | Facility: CLINIC | Age: 34
Discharge: HOME | End: 2024-12-16
Payer: MEDICAID

## 2024-12-16 ENCOUNTER — APPOINTMENT (OUTPATIENT)
Dept: ORTHOPEDIC SURGERY | Facility: CLINIC | Age: 34
End: 2024-12-16
Payer: MEDICAID

## 2024-12-16 VITALS — BODY MASS INDEX: 27.47 KG/M2 | HEIGHT: 67 IN | WEIGHT: 175 LBS

## 2024-12-16 DIAGNOSIS — M54.50 LOW BACK PAIN, UNSPECIFIED BACK PAIN LATERALITY, UNSPECIFIED CHRONICITY, UNSPECIFIED WHETHER SCIATICA PRESENT: ICD-10-CM

## 2024-12-16 DIAGNOSIS — M54.16 LUMBAR RADICULOPATHY: ICD-10-CM

## 2024-12-16 DIAGNOSIS — M25.78 OSTEOPHYTE OF VERTEBRAE: ICD-10-CM

## 2024-12-16 PROCEDURE — 72110 X-RAY EXAM L-2 SPINE 4/>VWS: CPT | Performed by: RADIOLOGY

## 2024-12-16 PROCEDURE — 72110 X-RAY EXAM L-2 SPINE 4/>VWS: CPT

## 2024-12-16 PROCEDURE — 3008F BODY MASS INDEX DOCD: CPT | Performed by: ORTHOPAEDIC SURGERY

## 2024-12-16 PROCEDURE — 99243 OFF/OP CNSLTJ NEW/EST LOW 30: CPT | Performed by: ORTHOPAEDIC SURGERY

## 2024-12-16 ASSESSMENT — PAIN - FUNCTIONAL ASSESSMENT: PAIN_FUNCTIONAL_ASSESSMENT: 0-10

## 2024-12-16 NOTE — LETTER
December 17, 2024     Angle Spring, SHARONDA-CNP  [address]  [facility] [address]  [facility] [address]    Patient: Evangelina Noriega   YOB: 1990   Date of Visit: 12/16/2024       Dear Dr. Angle Spring, APRN-CNP:    Thank you for referring Evangelina Noriega to me for evaluation. Below are my notes for this consultation.  If you have questions, please do not hesitate to call me. I look forward to following your patient along with you.       Sincerely,     Jacinto Lala MD      CC: No Recipients  ______________________________________________________________________________________    HPI:Evangelina Noriega is a 34-year-old woman who comes in with complaints of back pain.  She has had some intermittent tingling into the leg as well.  This seems to started after a fall in May 2024.  She has not had physical therapy or other treatment.      ROS:  Reviewed on EMR and patient intake sheet.    PMH/SH:  Reviewed on EMR and patient intake sheet.    Exam:  Physical Exam    Constitutional: Well appearing; no acute distress  Eyes: pupils are equal and round  Psych: normal affect  Respiratory: non-labored breathing  Cardiovascular: regular rate and rhythm  GI: non-distended abdomen  Musculoskeletal: no pain with range of motion of the hips bilaterally  Neurologic: [4+]/5 strength in the lower extremities bilaterally]; [positive left] straight leg raise    Radiology:     X-rays personally reviewed.  They are unremarkable    Diagnosis:    Lumbar radiculopathy    Assessment and Plan:   34-year-old woman with lumbar radicular symptoms and some nerve root tension signs on exam.  Will begin with physical therapy.  If the symptoms do not improve, then MRI and follow-up would be appropriate.    The patient was in agreement with the plan. At the end of the visit today, the patient felt that all questions had been answered satisfactorily.  The patient was pleased with the visit and very appreciative  for the care rendered.     Thank you very much for the kind referral.  It is a privilege, and a pleasure, to partner with you in the care of your patients.  I would be delighted to assist you with any further consultations as needed.          Jacinto Lala MD    Chief of Spine Surgery, [facility]  Director of Spine Service, [facility]  , Department of Orthopaedics  [facility] [employer] [facility]  [address] [id number]  P: [phone]  [email or web address]    This note was dictated with voice recognition software.  It has not been proofread for grammatical errors, typographical mistakes or other semantic inconsistencies.

## 2024-12-17 NOTE — PROGRESS NOTES
HPI:Evangelina Noriega is a 34-year-old woman who comes in with complaints of back pain.  She has had some intermittent tingling into the leg as well.  This seems to started after a fall in May 2024.  She has not had physical therapy or other treatment.      ROS:  Reviewed on EMR and patient intake sheet.    PMH/SH:  Reviewed on EMR and patient intake sheet.    Exam:  Physical Exam    Constitutional: Well appearing; no acute distress  Eyes: pupils are equal and round  Psych: normal affect  Respiratory: non-labored breathing  Cardiovascular: regular rate and rhythm  GI: non-distended abdomen  Musculoskeletal: no pain with range of motion of the hips bilaterally  Neurologic: [4+]/5 strength in the lower extremities bilaterally]; [positive left] straight leg raise    Radiology:     X-rays personally reviewed.  They are unremarkable    Diagnosis:    Lumbar radiculopathy    Assessment and Plan:   34-year-old woman with lumbar radicular symptoms and some nerve root tension signs on exam.  Will begin with physical therapy.  If the symptoms do not improve, then MRI and follow-up would be appropriate.    The patient was in agreement with the plan. At the end of the visit today, the patient felt that all questions had been answered satisfactorily.  The patient was pleased with the visit and very appreciative for the care rendered.     Thank you very much for the kind referral.  It is a privilege, and a pleasure, to partner with you in the care of your patients.  I would be delighted to assist you with any further consultations as needed.          Jacinto Lala MD    Chief of Spine Surgery, [facility]  Director of Spine Service, [facility]  , Department of Orthopaedics  [facility] [employer]  [address] [place] [address]  P: [phone]  [facility]    This note was dictated with voice recognition  software.  It has not been proofread for grammatical errors, typographical mistakes or other semantic inconsistencies.

## 2025-01-14 ENCOUNTER — EVALUATION (OUTPATIENT)
Dept: PHYSICAL THERAPY | Facility: CLINIC | Age: 35
End: 2025-01-14
Payer: MEDICAID

## 2025-01-14 DIAGNOSIS — M54.16 LUMBAR RADICULOPATHY: ICD-10-CM

## 2025-01-14 PROCEDURE — 97161 PT EVAL LOW COMPLEX 20 MIN: CPT | Mod: GP | Performed by: PHYSICAL THERAPIST

## 2025-01-14 PROCEDURE — 97535 SELF CARE MNGMENT TRAINING: CPT | Mod: GP | Performed by: PHYSICAL THERAPIST

## 2025-01-14 ASSESSMENT — ENCOUNTER SYMPTOMS
OCCASIONAL FEELINGS OF UNSTEADINESS: 0
LOSS OF SENSATION IN FEET: 0
DEPRESSION: 0

## 2025-01-14 ASSESSMENT — COLUMBIA-SUICIDE SEVERITY RATING SCALE - C-SSRS
1. IN THE PAST MONTH, HAVE YOU WISHED YOU WERE DEAD OR WISHED YOU COULD GO TO SLEEP AND NOT WAKE UP?: NO
6. HAVE YOU EVER DONE ANYTHING, STARTED TO DO ANYTHING, OR PREPARED TO DO ANYTHING TO END YOUR LIFE?: NO
2. HAVE YOU ACTUALLY HAD ANY THOUGHTS OF KILLING YOURSELF?: NO

## 2025-01-14 NOTE — PROGRESS NOTES
Physical Therapy Evaluation    Patient Name: Evangelina Noriega  MRN: [id number]  Today's Date: 1/14/2025  Referred by: Dr. Lala  Visit: 1/30    Time Calculation  Start Time: 1110  Stop Time: 1150  Time Calculation (min): 40 min  Diagnosis:  1. Lumbar radiculopathy  Referral to Physical Therapy    Follow Up In Physical Therapy      PRECAUTIONS:   Fall Risk: Low    SUBJECTIVE:  Patient reports 10+ year history of LBP and left sided sciatica, had a fall last May that aggravated symptoms, recent x-ray was good, had chiropractic care and therapy years ago without significant improvement, reports constant LBP and left sided sciatica that is worse with standing, sleep difficult, no current exercise program, not currently working.  Pain:  3-9/10 across low back and down LLE  Home Living:  No issues  Prior level of function:  Long history of LBP, recently aggravated  Personal Factors Impacting Care:  None    OBJECTIVE:  Lumbar AROM: (% movement)   Flexion full   Extension full   Right Sidebend full   Left Sidebend full   Right Rotation full   Left Rotation full     Lumbar Sustained Movement Response   Flexion in standing No change   Extension in standing Increased LBP   Right Sideglide    Left Sideglide    Flexion in supine No change   Extension in prone Increased pain     Hip/core Strength:  Left hip abduction and ER 4-/5  Gait:  Trendelenburg pattern  Palpation:  +TTP across low back  Dermatomal Impairment:  Lower L/S  Myotomal Impairment:  none  Functional Outcome Measure:  Oswestry 26%    ASSESSMENT:  Patient presents with c/o chronic LBP and left sided sciatica, recently aggravated by fall, significant left hip and core weakness as contributing factor, hypermobility noted, will benefit from progressive strengthening program to see if we can improve symptoms.    Patient presents with the following deficits/problems that indicate the need for skilled PT: LBP with sciatica, core and left hip weakness.    Low complexity due  to patient's clinical presentation being stable and uncomplicated by any significant comorbidities that may affect rehab tolerance and progression.     Clinical presentation:  Stable and/or uncomplicated characteristics    TREATMENT:  Initial evaluation performed followed by discussion of findings and instruction in HEP.    PATIENT EDUCATION:  Access Code: [email or web address]  URL: [email or web address]  Date: 01/14/2025  Prepared by: Bautista Miller    Exercises  - Seated Flexion Stretch  - 1 x daily - 7 x weekly - 1 sets - 3 reps - 30 hold  - Supine Posterior Pelvic Tilt  - 1 x daily - 7 x weekly - 1 sets - 10 reps - 10 hold  - Supine Pelvic Tilt with Straight Leg Raise  - 1 x daily - 7 x weekly - 1 sets - 15 reps  - Supine Bridge  - 1 x daily - 7 x weekly - 2 sets - 10 reps  - Beginner Side Leg Lift  - 1 x daily - 7 x weekly - 2 sets - 10 reps  - Clamshell  - 1 x daily - 7 x weekly - 2 sets - 10 reps    PLAN:   HEP daily, follow up in 2 weeks.    Rehab potential:  Good  Plan of care agreement  Patient    GOALS:  Active       PT Problem       Decrease c/o LBP and left sided sciatica to 2/10 at worst       Start:  01/14/25    Expected End:  03/11/25            Improve left hip and core strength to at least 4+/5       Start:  01/14/25    Expected End:  03/11/25            Independent with HEP       Start:  01/14/25    Expected End:  03/11/25            Patient Stated Goal: alleviate pain       Start:  01/14/25    Expected End:  03/11/25

## 2025-01-27 ENCOUNTER — CLINICAL SUPPORT (OUTPATIENT)
Dept: SLEEP MEDICINE | Facility: CLINIC | Age: 35
End: 2025-01-27
Payer: MEDICAID

## 2025-01-27 DIAGNOSIS — G47.33 OBSTRUCTIVE SLEEP APNEA (ADULT) (PEDIATRIC): ICD-10-CM

## 2025-01-27 DIAGNOSIS — R06.83 SNORING: ICD-10-CM

## 2025-01-27 PROCEDURE — 95806 SLEEP STUDY UNATT&RESP EFFT: CPT | Performed by: INTERNAL MEDICINE

## 2025-01-27 NOTE — PROGRESS NOTES
Type of Study: HOME SLEEP STUDY - NOMAD     The patient received equipment and instructions for use of the [employer] Nomad HSAT device. The patient was instructed how to apply the effort belts, cannula, thermistor. It was also explained how the Nomad and oximeter components work.  The patient was asked to record their sleep for an 8-hour period.     The patient was informed of their responsibility for the device and acknowledged this by signing the HSAT device contract. The patient was asked to return the device on 01/28/2025 by 10 AM to the Respiratory Care Department at [facility].     The patient was instructed to call 911 as usual for any medical- emergencies while at home.  The patient was also given a phone number for troubleshooting when using the device in case there were additional questions.

## 2025-02-10 ENCOUNTER — APPOINTMENT (OUTPATIENT)
Dept: PRIMARY CARE | Facility: CLINIC | Age: 35
End: 2025-02-10
Payer: MEDICAID

## 2025-02-10 VITALS
OXYGEN SATURATION: 97 % | RESPIRATION RATE: 20 BRPM | SYSTOLIC BLOOD PRESSURE: 108 MMHG | DIASTOLIC BLOOD PRESSURE: 68 MMHG | TEMPERATURE: 96.1 F | WEIGHT: 178.4 LBS | HEART RATE: 75 BPM | HEIGHT: 67 IN | BODY MASS INDEX: 28 KG/M2

## 2025-02-10 DIAGNOSIS — L30.9 ECZEMA, UNSPECIFIED TYPE: ICD-10-CM

## 2025-02-10 DIAGNOSIS — R61 EXCESSIVE SWEATING: Primary | ICD-10-CM

## 2025-02-10 DIAGNOSIS — I10 PRIMARY HYPERTENSION: ICD-10-CM

## 2025-02-10 DIAGNOSIS — R06.83 SNORING: ICD-10-CM

## 2025-02-10 DIAGNOSIS — H69.91 EUSTACHIAN TUBE DYSFUNCTION, RIGHT: ICD-10-CM

## 2025-02-10 DIAGNOSIS — H60.503 ACUTE OTITIS EXTERNA OF BOTH EARS, UNSPECIFIED TYPE: ICD-10-CM

## 2025-02-10 PROCEDURE — 99214 OFFICE O/P EST MOD 30 MIN: CPT

## 2025-02-10 PROCEDURE — 3008F BODY MASS INDEX DOCD: CPT

## 2025-02-10 PROCEDURE — 1036F TOBACCO NON-USER: CPT

## 2025-02-10 PROCEDURE — 3078F DIAST BP <80 MM HG: CPT

## 2025-02-10 PROCEDURE — 3074F SYST BP LT 130 MM HG: CPT

## 2025-02-10 RX ORDER — CIPROFLOXACIN AND DEXAMETHASONE 3; 1 MG/ML; MG/ML
4 SUSPENSION/ DROPS AURICULAR (OTIC) 2 TIMES DAILY
Qty: 7.5 ML | Refills: 0 | Status: SHIPPED | OUTPATIENT
Start: 2025-02-10 | End: 2025-02-17

## 2025-02-10 RX ORDER — FLUTICASONE PROPIONATE 50 MCG
1 SPRAY, SUSPENSION (ML) NASAL DAILY
Qty: 16 G | Refills: 3 | Status: SHIPPED | OUTPATIENT
Start: 2025-02-10 | End: 2025-03-12

## 2025-02-10 RX ORDER — PROPRANOLOL HYDROCHLORIDE 20 MG/1
20 TABLET ORAL 2 TIMES DAILY
Qty: 60 TABLET | Refills: 5 | Status: SHIPPED | OUTPATIENT
Start: 2025-02-10 | End: 2025-08-09

## 2025-02-10 RX ORDER — CLOBETASOL PROPIONATE 0.5 MG/G
CREAM TOPICAL 2 TIMES DAILY
Qty: 15 G | Refills: 0 | Status: SHIPPED | OUTPATIENT
Start: 2025-02-10 | End: 2025-02-24

## 2025-02-10 SDOH — ECONOMIC STABILITY: FOOD INSECURITY: WITHIN THE PAST 12 MONTHS, YOU WORRIED THAT YOUR FOOD WOULD RUN OUT BEFORE YOU GOT MONEY TO BUY MORE.: NEVER TRUE

## 2025-02-10 SDOH — ECONOMIC STABILITY: FOOD INSECURITY: WITHIN THE PAST 12 MONTHS, THE FOOD YOU BOUGHT JUST DIDN'T LAST AND YOU DIDN'T HAVE MONEY TO GET MORE.: NEVER TRUE

## 2025-02-10 ASSESSMENT — PAIN SCALES - GENERAL: PAINLEVEL_OUTOF10: 0-NO PAIN

## 2025-02-10 ASSESSMENT — LIFESTYLE VARIABLES
AUDIT-C TOTAL SCORE: 0
HOW OFTEN DO YOU HAVE A DRINK CONTAINING ALCOHOL: NEVER
HOW OFTEN DO YOU HAVE SIX OR MORE DRINKS ON ONE OCCASION: NEVER
SKIP TO QUESTIONS 9-10: 1
HOW MANY STANDARD DRINKS CONTAINING ALCOHOL DO YOU HAVE ON A TYPICAL DAY: PATIENT DOES NOT DRINK

## 2025-02-10 ASSESSMENT — ENCOUNTER SYMPTOMS
LOSS OF SENSATION IN FEET: 0
OCCASIONAL FEELINGS OF UNSTEADINESS: 0
RESPIRATORY NEGATIVE: 1
HEMATOLOGIC/LYMPHATIC NEGATIVE: 1
EYES NEGATIVE: 1
CARDIOVASCULAR NEGATIVE: 1
MUSCULOSKELETAL NEGATIVE: 1
CONSTITUTIONAL NEGATIVE: 1
ENDOCRINE COMMENTS: EXCESSIVE SWEATING
DEPRESSION: 0
NEUROLOGICAL NEGATIVE: 1
GASTROINTESTINAL NEGATIVE: 1

## 2025-02-10 ASSESSMENT — PATIENT HEALTH QUESTIONNAIRE - PHQ9
SUM OF ALL RESPONSES TO PHQ9 QUESTIONS 1 & 2: 0
2. FEELING DOWN, DEPRESSED OR HOPELESS: NOT AT ALL
1. LITTLE INTEREST OR PLEASURE IN DOING THINGS: NOT AT ALL

## 2025-02-10 NOTE — PROGRESS NOTES
"Subjective   Patient ID: Evangelina Noriega is a 35 y.o. female who presents for Follow-up (Still not sleeping - taking a whole tramadol instead of a half), Ear Fullness (Left ear feels full and tender - hearing loss - 2 months ), results (Sleep study), Dry skin (Under Right eye dryness and rash), and Excessive Sweating (Noticed more sweating (cold or hot flash).  Medication changes lately.  -2 months).    HPI     Evangelina presents for a follow up. She has multiple concerns at this time:  She states that she has been sweating excessively over the last couple months- she states that it happens when she is hot or cold. All labs have been WNL- referral placed to endocrinology.  She has been experiencing some dry skin on her right cheek- it appears to be eczema. Clobetasol cream ordered  Evangelina also endorses that she has had some left ear pain- ear canal red upon assessment. Ear drops and flonase sent to pharmacy.  She did complete a home sleep study- results were negative but she states that the sensor came off her finger during the night and would like to have an in-center sleep study completed.   Follow up in 6 months and sooner as needed.     Review of Systems   Constitutional: Negative.    HENT:  Positive for ear pain.    Eyes: Negative.    Respiratory: Negative.     Cardiovascular: Negative.    Gastrointestinal: Negative.    Endocrine:        Excessive sweating   Genitourinary: Negative.    Musculoskeletal: Negative.    Neurological: Negative.    Hematological: Negative.        Objective   /68 (BP Location: Right arm, Patient Position: Sitting, BP Cuff Size: Adult)   Pulse 75   Temp 35.6 °C (96.1 °F) (Temporal)   Resp 20   Ht 1.702 m (5' 7\")   Wt 80.9 kg (178 lb 6.4 oz)   SpO2 97%   BMI 27.94 kg/m²     Physical Exam  HENT:      Right Ear: Tenderness present.      Left Ear: Tenderness present.   Cardiovascular:      Rate and Rhythm: Normal rate and regular rhythm.      Pulses: Normal pulses.      Heart " sounds: Normal heart sounds.   Pulmonary:      Effort: Pulmonary effort is normal.      Breath sounds: Normal breath sounds.   Musculoskeletal:         General: Normal range of motion.   Skin:     General: Skin is warm and dry.      Capillary Refill: Capillary refill takes less than 2 seconds.   Neurological:      Mental Status: She is oriented to person, place, and time.   Psychiatric:         Mood and Affect: Mood normal.         Behavior: Behavior normal.         Thought Content: Thought content normal.         Judgment: Judgment normal.         Assessment/Plan   Problem List Items Addressed This Visit             ICD-10-CM    Primary hypertension I10    Relevant Medications    propranolol (Inderal) 20 mg tablet     Other Visit Diagnoses         Codes    Excessive sweating    -  Primary R61    Relevant Orders    Referral to Endocrinology    Eustachian tube dysfunction, right     H69.91    Relevant Medications    fluticasone (Flonase) 50 mcg/actuation nasal spray    Acute otitis externa of both ears, unspecified type     H60.503    Relevant Medications    ciprofloxacin-dexamethasone (Ciprodex) otic suspension    Eczema, unspecified type     L30.9    Relevant Medications    clobetasol (Temovate) 0.05 % cream    Snoring     R06.83    Relevant Orders    In-Center Sleep Study (Non-Sleep Provider Only)

## 2025-02-10 NOTE — PATIENT INSTRUCTIONS
Thank you for coming to see me today.  If you have any questions or concerns following our visit, please contact the office.  Phone: [phone]    Follow up with me in 6 months

## 2025-02-12 ENCOUNTER — TREATMENT (OUTPATIENT)
Dept: PHYSICAL THERAPY | Facility: CLINIC | Age: 35
End: 2025-02-12
Payer: MEDICAID

## 2025-02-12 DIAGNOSIS — M54.16 LUMBAR RADICULOPATHY: ICD-10-CM

## 2025-02-12 PROCEDURE — 97110 THERAPEUTIC EXERCISES: CPT | Mod: GP | Performed by: PHYSICAL THERAPIST

## 2025-02-12 NOTE — PROGRESS NOTES
"  Physical Therapy Treatment    Patient Name: Evangelina Noriega  MRN: [id number]  Today's Date: 2/12/2025  Visit: 2/30     Time Calculation  Start Time: 1420  Stop Time: 1455  Time Calculation (min): 35 min  Diagnosis:   1. Lumbar radiculopathy  Follow Up In Physical Therapy        PRECAUTIONS:  Fall Risk: Low    SUBJECTIVE:  Patient reports consistency with HEP, difficulty with clam shell and it seems to increase pain, other exercises do seem to help pain.    Pain: 2/10 left sided low back  Compliant with HEP? Yes    OBJECTIVE:  Left hip 4-/5 strength    TREATMENT:  - Therex:   Bike x 6' L3  Seated flexion stretch 3x30\"  PPT 10x10\"  PPT with SLR 3x10  BTB bridge 3x10  BTB supine slams  SL hip abd 3x10  Quadruped hip ext 2x10  Modified plank 5x15 sec    ASSESSMENT:  Patient doing well overall, some continued left sided LBP but appears to be responding to exercises, altered routine and given new HEP as well as blue thera-band, will benefit from continued progressive strengthening.     EDUCATION:  Access Code: [email or web address]  URL: [email or web address]  Date: 02/12/2025  Prepared by: Bautista Miller    Exercises  - Seated Flexion Stretch  - 1 x daily - 7 x weekly - 1 sets - 3 reps - 30 hold  - Supine Posterior Pelvic Tilt  - 1 x daily - 7 x weekly - 1 sets - 10 reps - 10 hold  - Supine Pelvic Tilt with Straight Leg Raise  - 1 x daily - 7 x weekly - 3 sets - 10 reps  - Supine Bridge with Resistance Band  - 1 x daily - 7 x weekly - 3 sets - 10 reps  - Hooklying Clamshell with Resistance  - 1 x daily - 7 x weekly - 3 sets - 10 reps  - Beginner Side Leg Lift  - 1 x daily - 7 x weekly - 3 sets - 10 reps  - Beginner Front Arm Support  - 1 x daily - 7 x weekly - 2 sets - 10 reps  - Kneeling Plank with Feet on Ground  - 1 x daily - 7 x weekly - 1 sets - 5-10 reps - 30 hold    PLAN:   HEP daily, follow up in 2 weeks.       "

## 2025-02-20 DIAGNOSIS — R61 HYPERHIDROSIS: Primary | ICD-10-CM

## 2025-02-20 RX ORDER — OXYBUTYNIN CHLORIDE 5 MG/1
5 TABLET ORAL 2 TIMES DAILY
Qty: 60 TABLET | Refills: 5 | Status: SHIPPED | OUTPATIENT
Start: 2025-02-20 | End: 2025-08-19

## 2025-02-25 DIAGNOSIS — J01.90 ACUTE BACTERIAL SINUSITIS: Primary | ICD-10-CM

## 2025-02-25 DIAGNOSIS — B96.89 ACUTE BACTERIAL SINUSITIS: Primary | ICD-10-CM

## 2025-02-25 RX ORDER — PREDNISONE 20 MG/1
40 TABLET ORAL DAILY
Qty: 10 TABLET | Refills: 0 | Status: SHIPPED | OUTPATIENT
Start: 2025-02-25 | End: 2025-03-02

## 2025-02-25 RX ORDER — AZITHROMYCIN 250 MG/1
TABLET, FILM COATED ORAL
Qty: 6 TABLET | Refills: 0 | Status: SHIPPED | OUTPATIENT
Start: 2025-02-25 | End: 2025-03-02

## 2025-02-26 ENCOUNTER — APPOINTMENT (OUTPATIENT)
Dept: PHYSICAL THERAPY | Facility: CLINIC | Age: 35
End: 2025-02-26
Payer: MEDICAID

## 2025-03-07 ENCOUNTER — APPOINTMENT (OUTPATIENT)
Dept: PRIMARY CARE | Facility: CLINIC | Age: 35
End: 2025-03-07
Payer: MEDICAID

## 2025-03-16 ENCOUNTER — OFFICE VISIT (OUTPATIENT)
Dept: URGENT CARE | Age: 35
End: 2025-03-16
Payer: MEDICAID

## 2025-03-16 VITALS
SYSTOLIC BLOOD PRESSURE: 125 MMHG | DIASTOLIC BLOOD PRESSURE: 57 MMHG | OXYGEN SATURATION: 98 % | TEMPERATURE: 98.4 F | RESPIRATION RATE: 16 BRPM | BODY MASS INDEX: 28.19 KG/M2 | WEIGHT: 180 LBS | HEART RATE: 104 BPM

## 2025-03-16 DIAGNOSIS — R44.3 HALLUCINATIONS: Primary | ICD-10-CM

## 2025-03-16 DIAGNOSIS — B37.9 CANDIDIASIS: ICD-10-CM

## 2025-03-16 DIAGNOSIS — F31.60 BIPOLAR AFFECTIVE DISORDER, MIXED (MULTI): ICD-10-CM

## 2025-03-16 DIAGNOSIS — K12.0 APHTHOUS ULCER: ICD-10-CM

## 2025-03-16 DIAGNOSIS — R05.1 ACUTE COUGH: ICD-10-CM

## 2025-03-16 PROCEDURE — 3078F DIAST BP <80 MM HG: CPT | Performed by: NURSE PRACTITIONER

## 2025-03-16 PROCEDURE — 99214 OFFICE O/P EST MOD 30 MIN: CPT | Performed by: NURSE PRACTITIONER

## 2025-03-16 PROCEDURE — 1036F TOBACCO NON-USER: CPT | Performed by: NURSE PRACTITIONER

## 2025-03-16 PROCEDURE — 3074F SYST BP LT 130 MM HG: CPT | Performed by: NURSE PRACTITIONER

## 2025-03-16 ASSESSMENT — ENCOUNTER SYMPTOMS
HALLUCINATIONS: 1
AGITATION: 1
FATIGUE: 0
COUGH: 0
HYPERACTIVE: 1
ARTHRALGIAS: 0
HEADACHES: 0
NERVOUS/ANXIOUS: 1
ACTIVITY CHANGE: 0
SLEEP DISTURBANCE: 1
CONFUSION: 0

## 2025-03-16 ASSESSMENT — PAIN SCALES - GENERAL: PAINLEVEL_OUTOF10: 7

## 2025-03-16 NOTE — PROGRESS NOTES
"Subjective   Patient ID: Evangelina Noriega is a 35 y.o. female. They present today with a chief complaint of Cough ([facility]. Followed up with PCP and treated with a z-pack and prednisone. Patient stated,\"I got a little better, but still had the cough and congestion.\" She had a recent diagnosis of thrush and finished nystatin. Now, diarrhea and vomit and concerned that she may have pneumonia.).    History of Present Illness    History provided by:  Patient and parent      Patient presents with concerns of ongoing issues with health, recurrent yeast infections, oral ulcers, worried about her cough and walking pneumonia, worried about ear pain.  After discussing her physical symptoms I asked about her current mental health.   Patient states her \"bipolar\" is not well controlled and she has been manic for the past few days. She states last night she finally slept for a few hours but has not slept in days. Last night she was experiencing hallucinations and her mom almost took her to the ED. She was previously well controlled on lamictal but now her meds have been switched and she is not experiencing a stable state with her mental health. She denies suicidal or homicidal ideation.  Past Medical History  Allergies as of 03/16/2025 - Reviewed 03/16/2025   Allergen Reaction Noted    Amoxicillin Hives 09/04/2023    Nitrofurantoin monohyd/m-cryst Hives 09/04/2023       (Not in a hospital admission)       Past Medical History:   Diagnosis Date    Bacterial upper respiratory infection 10/08/2024    Bacterial vaginosis 10/08/2024    Depression 01/15/2008    History of manic depressive disorder 10/08/2024    Personal history of other diseases of the digestive system 12/02/2015    History of indigestion    Personal history of other diseases of the respiratory system     History of asthma    Personal history of other mental and behavioral disorders     History of anxiety    Personal history of other " mental and behavioral disorders     History of bipolar disorder    Personal history of other specified conditions 09/30/2015    History of epigastric pain    Personal history of other specified conditions 08/28/2015    History of nausea    Personal history of other specified conditions 08/18/2014    History of dizziness    Personal history of other specified conditions 08/18/2014    History of fatigue    Urinary tract infection 10/08/2024       Past Surgical History:   Procedure Laterality Date    OTHER SURGICAL HISTORY  06/21/2018    Oral Surgery Tooth Extraction [facility]        reports that she has never smoked. She has never been exposed to tobacco smoke. She has never used smokeless tobacco. She reports current alcohol use of about 5.0 - 7.0 standard drinks of alcohol per week. She reports current drug use. Drug: Marijuana.    Review of Systems  Review of Systems   Constitutional:  Negative for activity change and fatigue.   Respiratory:  Negative for cough.    Cardiovascular:  Negative for chest pain.   Musculoskeletal:  Negative for arthralgias.   Neurological:  Negative for headaches.   Psychiatric/Behavioral:  Positive for agitation, hallucinations and sleep disturbance. Negative for behavioral problems, confusion, self-injury and suicidal ideas. The patient is nervous/anxious and is hyperactive.                                   Objective    Vitals:    03/16/25 1729   BP: 125/57   Pulse: 104   Resp: 16   Temp: 36.9 °C (98.4 °F)   SpO2: 98%   Weight: 81.6 kg (180 lb)     No LMP recorded.    Physical Exam  Vitals reviewed.   Constitutional:       Appearance: She is diaphoretic.   Cardiovascular:      Rate and Rhythm: Normal rate and regular rhythm.   Pulmonary:      Effort: Pulmonary effort is normal.      Breath sounds: Normal breath sounds.   Skin:     General: Skin is warm.   Neurological:      Mental Status: She is alert.   Psychiatric:         Mood and Affect: Mood is anxious and elated. Affect is  "tearful.         Speech: Speech is rapid and pressured.         Behavior: Behavior is hyperactive.         Thought Content: Thought content is paranoid. Thought content does not include homicidal or suicidal ideation. Thought content does not include homicidal or suicidal plan.         Procedures    Point of Care Test & Imaging Results from this visit  No results found for this visit on 03/16/25.   No results found.    Diagnostic study results (if any) were reviewed by KAREN Jordan.    Assessment/Plan   Allergies, medications, history, and pertinent labs/EKGs/Imaging reviewed by KAREN Jordan.     Medical Decision Making  Discussed with patient, with her permission her mother was present. We discussed her current state of her mental health is per patient \"manic, the worst it's ever been. Hallucinations were bad last night.\" She does not feel her medications are helping. She is not sleeping. She is fidgeting all over the exam table and unable to sit comfortably, constant tics and movement. Mom and patient agree she would be best evaluated at an ED with possible psych consultation and workup. Patient denies suicidal or homicidal thoughts. She is visibly uncomfortable in her anxious state. Mom will take her to the ED at [facility].     Orders and Diagnoses  Diagnoses and all orders for this visit:  Hallucinations  Bipolar affective disorder, mixed (Multi)  Aphthous ulcer  Candidiasis  Acute cough      Medical Admin Record      Patient disposition: ED    Electronically signed by KAREN Jordan  6:50 PM      "

## 2025-03-18 DIAGNOSIS — Z30.9 ENCOUNTER FOR CONTRACEPTIVE MANAGEMENT, UNSPECIFIED TYPE: ICD-10-CM

## 2025-03-18 RX ORDER — NORGESTREL AND ETHINYL ESTRADIOL 0.3-0.03MG
1 KIT ORAL DAILY
Qty: 84 TABLET | Refills: 1 | Status: SHIPPED | OUTPATIENT
Start: 2025-03-18 | End: 2026-03-18

## 2025-03-18 NOTE — TELEPHONE ENCOUNTER
Reviewing  EMR  Last Annual Exam: 02/13/2024  Negative Pap / Negative HPV 2023  No future annual is scheduled- [employer] message sent  6 month supply of medication pended for Rhonda Cullen CNP

## 2025-03-22 ENCOUNTER — APPOINTMENT (OUTPATIENT)
Dept: SLEEP MEDICINE | Facility: CLINIC | Age: 35
End: 2025-03-22
Payer: MEDICAID

## 2025-04-18 RX ORDER — BUSPIRONE HYDROCHLORIDE 10 MG/1
TABLET ORAL
COMMUNITY
Start: 2024-10-30 | End: 2025-04-21 | Stop reason: ALTCHOICE

## 2025-04-18 RX ORDER — LAMOTRIGINE 100 MG/1
0.5 TABLET ORAL
COMMUNITY
Start: 2025-03-05

## 2025-04-18 RX ORDER — NYSTATIN 100000 [USP'U]/ML
SUSPENSION ORAL
COMMUNITY
Start: 2025-03-05

## 2025-04-21 ENCOUNTER — APPOINTMENT (OUTPATIENT)
Dept: GASTROENTEROLOGY | Facility: CLINIC | Age: 35
End: 2025-04-21
Payer: MEDICAID

## 2025-04-21 VITALS — WEIGHT: 176 LBS | HEIGHT: 67 IN | BODY MASS INDEX: 27.62 KG/M2 | HEART RATE: 80 BPM

## 2025-04-21 DIAGNOSIS — K58.0 IRRITABLE BOWEL SYNDROME WITH DIARRHEA: ICD-10-CM

## 2025-04-21 DIAGNOSIS — K21.9 GASTROESOPHAGEAL REFLUX DISEASE, UNSPECIFIED WHETHER ESOPHAGITIS PRESENT: Primary | ICD-10-CM

## 2025-04-21 DIAGNOSIS — R13.10 DYSPHAGIA, UNSPECIFIED TYPE: ICD-10-CM

## 2025-04-21 PROCEDURE — 3008F BODY MASS INDEX DOCD: CPT | Performed by: INTERNAL MEDICINE

## 2025-04-21 PROCEDURE — 1036F TOBACCO NON-USER: CPT | Performed by: INTERNAL MEDICINE

## 2025-04-21 PROCEDURE — 99214 OFFICE O/P EST MOD 30 MIN: CPT | Performed by: INTERNAL MEDICINE

## 2025-04-21 RX ORDER — TRAZODONE HYDROCHLORIDE 100 MG/1
100 TABLET ORAL NIGHTLY PRN
COMMUNITY
Start: 2025-04-13

## 2025-04-21 RX ORDER — DESIPRAMINE HYDROCHLORIDE 10 MG/1
20 TABLET ORAL DAILY
Qty: 180 TABLET | Refills: 1 | Status: SHIPPED | OUTPATIENT
Start: 2025-04-21

## 2025-04-21 NOTE — PROGRESS NOTES
REASON FOR VISIT: Discuss IBS, reflux    HPI:  Evangelina Noriega is a 35 y.o. female with a past medical history of diarrhea-predominant IBS managed on low-dose desipramine 10 mg being evaluated in the office for follow-up.  Finds that she is having some intermittent days of diarrhea still have breakthrough.  About 2 to 3 days a month will have episodes of constipation otherwise.  No rectal bleeding.  Last colonoscopy 3 years ago.  No evidence of microscopic colitis.  She had subcentimeter tubular adenoma resected then.  Recently has noted some increased heartburn symptoms.  Some globus and dysphagia symptoms rarely.  Globus frequently when she is laying down at night.  Had remote upper endoscopy about 10 years ago or more.  Not on any regular acid suppressive therapy at this point.          PRIOR ENDOSCOPY    PAST MEDICAL HISTORY  Medical History[1]    PAST SURGICAL HISTORY  Surgical History[2]    FAMILY HISTORY  Family History[3]    SOCIAL HISTORY  Social History     Tobacco Use    Smoking status: Never     Passive exposure: Never    Smokeless tobacco: Never   Substance Use Topics    Alcohol use: Yes     Alcohol/week: 5.0 - 7.0 standard drinks of alcohol     Types: 5 - 7 Standard drinks or equivalent per week       REVIEW OF SYSTEMS  CONSTITUTIONAL: negative for fever, chills, fatigue, or unintentional weight loss,   HEENT: negative for icteric sclera, eye pain/redness, or changes in vision/hearing  RESPIRATORY: negative for cough, hemoptysis, wheezing, orthopnea, or dyspnea on exertion  CARDIOVASCULAR: negative for chest pain, palpitations, or syncope   GASTROINTESTINAL: as noted per HPI  GENITOURINARY: negative for dysuria, polyuria, incontinence, or hematuria  MUSCULOSKELETAL: negative for arthralgia, myalgia, or joint swelling/stiffness   INTEGUMENTARY/SKIN: negative jaundice, rash, or skin lesion  HEMATOLOGIC/LYMPHATIC: negative for prolonged bleeding, easy bruising, or swollen lymph nodes  ENDOCRINE: negative  "for cold/heat intolerance, polydipsia, polyuria, or goiter  NEUROLOGIC: negative for headaches, dizziness, tremor, or gait abnormality  PSYCHIATRIC: negative for anxiety, depression, personality changes, or sleep disturbances      A 10 point review of systems was completed and was otherwise negative.    ALLERGIES  Allergies[4]    MEDICATIONS  Current Medications[5]    VITALS  Pulse 80   Ht 1.702 m (5' 7\")   Wt 79.8 kg (176 lb)   BMI 27.57 kg/m²      PHYSICAL EXAM  Alert and oriented in no acute distress    ASSESSMENT/ PLAN  Patient with diarrhea predominant IBS on desipramine.  Having some breakthrough diarrhea symptoms.  Will increase desipramine to 20 mg daily to see if that controls her better as she has had significant improvement with her desipramine.  Having more frequent heartburn symptoms as well as globus and rare dysphagia.  Given the symptoms we will pursue upper endoscopy to assess for reflux esophagitis and rule out any stricturing of the esophagus or EOE.  She is in agreement with the plan will follow-up with me at upper endoscopy.        Signature: Michael Nagy MD    Date: 4/21/2025  Time: 1:39 PM         [1]   Past Medical History:  Diagnosis Date    Bacterial upper respiratory infection 10/08/2024    Bacterial vaginosis 10/08/2024    Depression 01/15/2008    History of manic depressive disorder 10/08/2024    Personal history of other diseases of the digestive system 12/02/2015    History of indigestion    Personal history of other diseases of the respiratory system     History of asthma    Personal history of other mental and behavioral disorders     History of anxiety    Personal history of other mental and behavioral disorders     History of bipolar disorder    Personal history of other specified conditions 09/30/2015    History of epigastric pain    Personal history of other specified conditions 08/28/2015    History of nausea    Personal history of other specified conditions 08/18/2014    " History of dizziness    Personal history of other specified conditions 08/18/2014    History of fatigue    Urinary tract infection 10/08/2024   [2]   Past Surgical History:  Procedure Laterality Date    OTHER SURGICAL HISTORY  06/21/2018    Oral Surgery Tooth Extraction [facility]   [3]   Family History  Problem Relation Name Age of Onset    Graves' disease Mother Evangelina Field     Depression Mother Evangelina Field     Stroke Father Edouard/[place]     Depression Sister Jannet     Depression Sister Izabella     Asthma Brother Peña     Mental illness Brother Alexy    [4]   Allergies  Allergen Reactions    Amoxicillin Hives    Nitrofurantoin Monohyd/M-Cryst Hives   [5]   Current Outpatient Medications   Medication Sig Dispense Refill    lamoTRIgine (LaMICtal) 100 mg tablet Take 0.5 tablets (50 mg) by mouth every 12 hours. (Patient taking differently: Take 1 tablet (100 mg) by mouth every 12 hours.)      norgestrel-ethinyl estradioL (Cryselle, 28,) 0.3-30 mg-mcg tablet Take 1 tablet by mouth once daily. 84 tablet 1    nystatin (Mycostatin) 100,000 unit/mL suspension TAKE 5 ML BY MOUTH FOUR TIMES DAILY FOR 7 DAYS. USE 1/2 DOSE ON EACH SIDE OF MOUTH. USE FOR 48 HOURS AFTER RESOLVED      propranolol (Inderal) 20 mg tablet Take 1 tablet (20 mg) by mouth 2 times a day. 60 tablet 5    sodium chloride (Ocean) 0.65 % nasal spray Administer 1 spray into affected nostril(s) if needed.      traZODone (Desyrel) 100 mg tablet Take 1 tablet (100 mg) by mouth as needed at bedtime for sleep.      desipramine (Norpramin) 10 mg tablet Take 2 tablets (20 mg) by mouth once daily. 180 tablet 1    famotidine (Pepcid) 20 mg tablet Take 1 tablet (20 mg) by mouth 2 times a day. 60 tablet 5    fluticasone (Flonase) 50 mcg/actuation nasal spray Administer 1 spray into each nostril once daily. Shake gently. Before first use, prime pump. After use, clean tip and replace cap. 16 g 3    lamoTRIgine (LaMICtal) 25 mg tablet Take 2 tablets (50 mg) by mouth  once daily. 60 tablet 0    oxybutynin (Ditropan) 5 mg tablet Take 1 tablet (5 mg) by mouth 2 times a day. (Patient not taking: Reported on 4/21/2025) 60 tablet 5     No current facility-administered medications for this visit.

## 2025-05-05 ENCOUNTER — APPOINTMENT (OUTPATIENT)
Dept: GASTROENTEROLOGY | Facility: EXTERNAL LOCATION | Age: 35
End: 2025-05-05
Payer: MEDICAID

## 2025-05-05 DIAGNOSIS — K21.9 GASTROESOPHAGEAL REFLUX DISEASE, UNSPECIFIED WHETHER ESOPHAGITIS PRESENT: ICD-10-CM

## 2025-05-05 DIAGNOSIS — K21.00 GASTROESOPHAGEAL REFLUX DISEASE WITH ESOPHAGITIS WITHOUT HEMORRHAGE: ICD-10-CM

## 2025-05-05 DIAGNOSIS — K31.89 OTHER DISEASES OF STOMACH AND DUODENUM: ICD-10-CM

## 2025-05-05 DIAGNOSIS — R13.10 DYSPHAGIA, UNSPECIFIED TYPE: Primary | ICD-10-CM

## 2025-05-05 DIAGNOSIS — K21.00 GASTROESOPHAGEAL REFLUX DISEASE WITH ESOPHAGITIS, UNSPECIFIED WHETHER HEMORRHAGE: Primary | ICD-10-CM

## 2025-05-05 PROCEDURE — 43239 EGD BIOPSY SINGLE/MULTIPLE: CPT | Performed by: INTERNAL MEDICINE

## 2025-05-05 RX ORDER — OMEPRAZOLE 20 MG/1
20 TABLET, DELAYED RELEASE ORAL
Qty: 60 TABLET | Refills: 1 | Status: SHIPPED | OUTPATIENT
Start: 2025-05-05 | End: 2025-07-04

## 2025-06-25 ENCOUNTER — APPOINTMENT (OUTPATIENT)
Dept: GASTROENTEROLOGY | Facility: CLINIC | Age: 35
End: 2025-06-25
Payer: MEDICAID

## 2025-06-25 VITALS — OXYGEN SATURATION: 98 % | HEIGHT: 67 IN | WEIGHT: 179 LBS | BODY MASS INDEX: 28.09 KG/M2 | HEART RATE: 80 BPM

## 2025-06-25 DIAGNOSIS — R13.10 DYSPHAGIA, UNSPECIFIED TYPE: ICD-10-CM

## 2025-06-25 DIAGNOSIS — K58.0 IRRITABLE BOWEL SYNDROME WITH DIARRHEA: ICD-10-CM

## 2025-06-25 DIAGNOSIS — K21.00 GASTROESOPHAGEAL REFLUX DISEASE WITH ESOPHAGITIS WITHOUT HEMORRHAGE: Primary | ICD-10-CM

## 2025-06-25 PROCEDURE — 99213 OFFICE O/P EST LOW 20 MIN: CPT | Performed by: INTERNAL MEDICINE

## 2025-06-25 PROCEDURE — 3008F BODY MASS INDEX DOCD: CPT | Performed by: INTERNAL MEDICINE

## 2025-06-25 NOTE — PROGRESS NOTES
REASON FOR VISIT: Follow-up after recent endoscopy    HPI:  Evangelina Noreiga is a 35 y.o. female with a past medical history of GERD being evaluated in the office for recent finding of mild reflux esophagitis.  When on twice daily PPI therapy followed by once daily over the past month.  Dysphagia symptoms now resolved.  Also history of IBS for which we increased her desipramine to 20 mg daily at last office visit.  Reports diarrhea has been much better controlled at this point feels overall better.  No other complaints at this time.          PRIOR ENDOSCOPY    PAST MEDICAL HISTORY  Medical History[1]    PAST SURGICAL HISTORY  Surgical History[2]    FAMILY HISTORY  Family History[3]    SOCIAL HISTORY  Social History     Tobacco Use    Smoking status: Never     Passive exposure: Never    Smokeless tobacco: Never   Substance Use Topics    Alcohol use: Yes     Alcohol/week: 5.0 - 7.0 standard drinks of alcohol     Types: 5 - 7 Standard drinks or equivalent per week       REVIEW OF SYSTEMS  CONSTITUTIONAL: negative for fever, chills, fatigue, or unintentional weight loss,   HEENT: negative for icteric sclera, eye pain/redness, or changes in vision/hearing  RESPIRATORY: negative for cough, hemoptysis, wheezing, orthopnea, or dyspnea on exertion  CARDIOVASCULAR: negative for chest pain, palpitations, or syncope   GASTROINTESTINAL: as noted per HPI  GENITOURINARY: negative for dysuria, polyuria, incontinence, or hematuria  MUSCULOSKELETAL: negative for arthralgia, myalgia, or joint swelling/stiffness   INTEGUMENTARY/SKIN: negative jaundice, rash, or skin lesion  HEMATOLOGIC/LYMPHATIC: negative for prolonged bleeding, easy bruising, or swollen lymph nodes  ENDOCRINE: negative for cold/heat intolerance, polydipsia, polyuria, or goiter  NEUROLOGIC: negative for headaches, dizziness, tremor, or gait abnormality  PSYCHIATRIC: negative for anxiety, depression, personality changes, or sleep disturbances  2    A 10 point review of  "systems was completed and was otherwise negative.    ALLERGIES  Allergies[4]    MEDICATIONS  Current Medications[5]    VITALS  Pulse 80   Ht 1.702 m (5' 7\")   Wt 81.2 kg (179 lb)   SpO2 98%   BMI 28.04 kg/m²      PHYSICAL EXAM  Alert and oriented in no acute distress    ASSESSMENT/ PLAN  Patient with GERD with recent finding of mild reflux esophagitis.  Treated with twice daily PPI for 2 weeks and dysphagia has resolved.  Will continue on her current once daily PPI as symptoms are controlled.  History of diarrhea predominant IBS much better now on desipramine 20 mg daily.  Will continue current therapy.  She will see me back in the office as needed.  We discussed the importance of continued GERD lifestyle modifications including staying upright to eat for 2 hours after eating and avoiding bedtime snacks.  She is in agreement with the plan.        Signature: Michael Nagy MD    Date: 6/25/2025  Time: 2:05 PM         [1]   Past Medical History:  Diagnosis Date    Bacterial upper respiratory infection 10/08/2024    Bacterial vaginosis 10/08/2024    Depression 01/15/2008    History of manic depressive disorder 10/08/2024    Personal history of other diseases of the digestive system 12/02/2015    History of indigestion    Personal history of other diseases of the respiratory system     History of asthma    Personal history of other mental and behavioral disorders     History of anxiety    Personal history of other mental and behavioral disorders     History of bipolar disorder    Personal history of other specified conditions 09/30/2015    History of epigastric pain    Personal history of other specified conditions 08/28/2015    History of nausea    Personal history of other specified conditions 08/18/2014    History of dizziness    Personal history of other specified conditions 08/18/2014    History of fatigue    Urinary tract infection 10/08/2024   [2]   Past Surgical History:  Procedure Laterality Date    OTHER " SURGICAL HISTORY  06/21/2018    Oral Surgery Tooth Extraction [facility]   [3]   Family History  Problem Relation Name Age of Onset    Graves' disease Mother Evangelina Field     Depression Mother Evangelina Field     Stroke Father Edouard/[place]     Depression Sister Jannet     Depression Sister Izabella     Asthma Brother Peña     Mental illness Brother Alexy    [4]   Allergies  Allergen Reactions    Amoxicillin Hives    Nitrofurantoin Monohyd/M-Cryst Hives   [5]   Current Outpatient Medications   Medication Sig Dispense Refill    desipramine (Norpramin) 10 mg tablet Take 2 tablets (20 mg) by mouth once daily. 180 tablet 1    famotidine (Pepcid) 20 mg tablet Take 1 tablet (20 mg) by mouth 2 times a day. 60 tablet 5    fluticasone (Flonase) 50 mcg/actuation nasal spray Administer 1 spray into each nostril once daily. Shake gently. Before first use, prime pump. After use, clean tip and replace cap. 16 g 3    lamoTRIgine (LaMICtal) 100 mg tablet Take 0.5 tablets (50 mg) by mouth every 12 hours. (Patient taking differently: Take 1 tablet (100 mg) by mouth every 12 hours.)      lamoTRIgine (LaMICtal) 25 mg tablet Take 2 tablets (50 mg) by mouth once daily. 60 tablet 0    norgestrel-ethinyl estradioL (Cryselle, 28,) 0.3-30 mg-mcg tablet Take 1 tablet by mouth once daily. 84 tablet 1    nystatin (Mycostatin) 100,000 unit/mL suspension TAKE 5 ML BY MOUTH FOUR TIMES DAILY FOR 7 DAYS. USE 1/2 DOSE ON EACH SIDE OF MOUTH. USE FOR 48 HOURS AFTER RESOLVED      omeprazole OTC (PriLOSEC OTC) 20 mg EC tablet Take 1 tablet (20 mg) by mouth 2 times a day before meals. Do not crush, chew, or split. 60 tablet 1    oxybutynin (Ditropan) 5 mg tablet Take 1 tablet (5 mg) by mouth 2 times a day. (Patient not taking: Reported on 4/21/2025) 60 tablet 5    propranolol (Inderal) 20 mg tablet Take 1 tablet (20 mg) by mouth 2 times a day. 60 tablet 5    sodium chloride (Ocean) 0.65 % nasal spray Administer 1 spray into affected nostril(s) if needed.       traZODone (Desyrel) 100 mg tablet Take 1 tablet (100 mg) by mouth as needed at bedtime for sleep.       No current facility-administered medications for this visit.

## 2025-07-07 ENCOUNTER — APPOINTMENT (OUTPATIENT)
Dept: OBSTETRICS AND GYNECOLOGY | Facility: CLINIC | Age: 35
End: 2025-07-07
Payer: MEDICAID

## 2025-07-07 VITALS — WEIGHT: 181 LBS | BODY MASS INDEX: 28.35 KG/M2 | SYSTOLIC BLOOD PRESSURE: 120 MMHG | DIASTOLIC BLOOD PRESSURE: 80 MMHG

## 2025-07-07 DIAGNOSIS — Z01.419 ENCOUNTER FOR WELL WOMAN EXAM WITH ROUTINE GYNECOLOGICAL EXAM: Primary | ICD-10-CM

## 2025-07-07 DIAGNOSIS — Z30.9 ENCOUNTER FOR CONTRACEPTIVE MANAGEMENT, UNSPECIFIED TYPE: ICD-10-CM

## 2025-07-07 PROCEDURE — 3074F SYST BP LT 130 MM HG: CPT | Performed by: NURSE PRACTITIONER

## 2025-07-07 PROCEDURE — 99395 PREV VISIT EST AGE 18-39: CPT | Performed by: NURSE PRACTITIONER

## 2025-07-07 PROCEDURE — 3079F DIAST BP 80-89 MM HG: CPT | Performed by: NURSE PRACTITIONER

## 2025-07-07 RX ORDER — NORGESTREL AND ETHINYL ESTRADIOL 0.3-0.03MG
1 KIT ORAL DAILY
Qty: 84 TABLET | Refills: 1 | Status: SHIPPED | OUTPATIENT
Start: 2025-07-07 | End: 2026-07-07

## 2025-07-07 NOTE — PROGRESS NOTES
HPI:   Evangelina Noriega is a 35 y.o. who presents today for her annual gynecologic exam without complaints    She has the following concerns; None. She is doing well. She is considering an IUD.     GYN HISTORY:  Periods are regular every 28-30 days, lasting 4 days.   Dysmenorrhea:none. Cyclic symptoms include none.   No intermenstrual bleeding, spotting, or discharge.    Current contraception: OCP (estrogen/progesterone)      Requests STD testing: no     PAP History   Last pap:   2023 Normal HPV Negative  History of abnormal pap: yes - ASCUS in 2016.   HPV vaccine: yes - x 1 dose.   @paphx@    Health Screening  Family history of breast, uterine, ovarian or colon cancer: no         The patient feels safe at home.         Review of Systems:   Constitutional: no fever and no chills.  Cardiovascular: no chest pain.   Respiratory: no shortness of breath.   Gastrointestinal: no nausea, no abdominal pain and no constipation  Genitourinary: no dysuria, no urinary incontinence, no vaginal dryness, no pelvic pain and no vaginal discharge.   Neurological: no headache.  Psychiatric: no anxiety and no depression.              Objective         /80   Wt 82.1 kg (181 lb)   LMP 05/26/2025 (Approximate)   BMI 28.35 kg/m²         Physical Exam:   Constitutional: Alert and in no acute distress. Well developed, well nourished.      Neck: No neck asymmetry. Supple. Thyroid not enlarged and there were no palpable thyroid nodules.      Cardiovascular: Heart rate and rhythm were normal, normal S1 and S2, no gallops, and no murmurs.      Pulmonary: No respiratory distress. Clear bilateral breath sounds.      Chest: Breasts: Normal appearance, no nipple discharge and no skin changes. Palpation of breasts and axillae: No palpable mass and no axillary lymphadenopathy.      Abdomen: Soft nontender; no abdominal mass palpated. Normal bowel sounds. No organomegaly.      Genitourinary:   - External genitalia: Normal.   - Palpation of  lymph nodes in groin: No inguinal lymphadenopathy.   - Bartholin's Urethral and Skenes Glands: Normal.   - Urethra: Normal.    -Bladder: Normal on palpation.   - Vagina: Normal.   - Cervix: Normal.   - Uterus: Normal. Right Adnexa/parametria: Normal. Left Adnexa/parametria: Normal.   - Perianal Area: Normal.      Skin: Normal skin color and pigmentation, normal skin turgor, and no rash     Psychiatric: Alert and oriented x 3. Affect normal to patient baseline. Mood: Appropriate.            Assessment/Plan       Diagnoses and all orders for this visit:  Encounter for well woman exam with routine gynecological exam  Evangelina presents for well woman exam. She is doing well. She would like to change to an IUD instead of her ocp d/t her use of lamictal PAP is up to date.   Encounter for contraceptive management, unspecified type  -     norgestrel-ethinyl estradioL (Cryselle, Jr,) 0.3-30 mg-mcg tablet; Take 1 tablet by mouth once daily.  OCP refilled until she is able to schedule IUD placement. Follow-up for IUD placement.       SHARONDA Mesa-CNP

## 2025-07-14 ENCOUNTER — APPOINTMENT (OUTPATIENT)
Dept: OBSTETRICS AND GYNECOLOGY | Facility: CLINIC | Age: 35
End: 2025-07-14
Payer: MEDICAID

## 2025-07-14 VITALS — DIASTOLIC BLOOD PRESSURE: 60 MMHG | SYSTOLIC BLOOD PRESSURE: 110 MMHG

## 2025-07-14 DIAGNOSIS — Z11.3 SCREENING FOR STD (SEXUALLY TRANSMITTED DISEASE): ICD-10-CM

## 2025-07-14 DIAGNOSIS — Z30.430 ENCOUNTER FOR IUD INSERTION: Primary | ICD-10-CM

## 2025-07-14 DIAGNOSIS — Z32.02 URINE PREGNANCY TEST NEGATIVE: ICD-10-CM

## 2025-07-14 LAB — PREGNANCY TEST URINE, POC: NEGATIVE

## 2025-07-14 PROCEDURE — 58300 INSERT INTRAUTERINE DEVICE: CPT | Performed by: NURSE PRACTITIONER

## 2025-07-14 PROCEDURE — 81025 URINE PREGNANCY TEST: CPT | Performed by: NURSE PRACTITIONER

## 2025-07-14 RX ORDER — LEVONORGESTREL 52 MG/1
1 INTRAUTERINE DEVICE INTRAUTERINE ONCE
COMMUNITY

## 2025-07-14 NOTE — PROGRESS NOTES
Subjective   Patient ID: Evangelina Noriega is a 35 y.o. female who presents for Insertion of an intrauterine device (Reviewing  EMR/Last Annual Exam: 07/07/2025/Negative Pap / Negative HPV 2023/Boyfriend Julio C is in the room with the patient).  HPI  Here for IUD insertion. She is on lamictal; no longer wants to take an ocp. Hx of heavy periods, desires Mirena. Consent singed.     Review of Systems   All other systems reviewed and are negative.      Objective   Physical Exam  Constitutional:       Appearance: Normal appearance.   Pulmonary:      Effort: Pulmonary effort is normal.      Breath sounds: Normal breath sounds.   Genitourinary:     General: Normal vulva.      Vagina: Normal.      Cervix: Normal.      Uterus: Normal.       Adnexa: Right adnexa normal and left adnexa normal.   Skin:     General: Skin is warm and dry.   Neurological:      Mental Status: She is alert.   Psychiatric:         Mood and Affect: Mood normal.         Behavior: Behavior normal.     Patient ID: Evangelina Noriega is a 35 y.o. female.    IUD Management    Performed by: KAREN Mesa  Authorized by: KAREN Mesa    Procedure: IUD insertion    Consent obtained by patient, parent, or legal power of  - including discussion of procedure risks and benefits, patient questions answered, and patient education provided: yes    Pregnancy risk: reasonably certain the patient is not pregnant    Date/Time of Insertion:  7/14/2025 3:01 PM  Immediately prior to procedure a time out was called: yes    Pelvic exam performed: yes    Speculum placed in vagina: yes    Cervix cleaned and prepped: yes    Tenaculum/Allis/Ring Forceps applied to cervix: yes    Anesthesia used: yes    Local anesthesia:  Topical  Local anesthetic:  Lidocaine  Uterus sound depth (cm):  7.5  Cervix dilated: yes    Cervix dilated with:  Cervical os finder  IUD inserted without complications: yes    OSM: levonorgestrel 20 mcg/24hr  Strings  trimmed to (cm):  3  Patient tolerated procedure well: yes    Inserted with ultrasound guidance: no    Transvaginal sono confirmed fundal placement: no    Estimated blood loss (mL):  5  Intended removal date: 8 years        Assessment/Plan   Diagnoses and all orders for this visit:  Encounter for IUD insertion  -     IUD Management  Screening for STD (sexually transmitted disease)  -     C. trachomatis / N. gonorrhoeae, Amplified, Urogenital  Urine pregnancy test negative  -     POCT pregnancy, urine manually resulted  Follow-up in 4-6 weeks for string check.        SHARONDA Mesa-CNP 07/14/25 2:41 PM

## 2025-07-15 LAB
C TRACH RRNA SPEC QL NAA+PROBE: NOT DETECTED
N GONORRHOEA RRNA SPEC QL NAA+PROBE: NOT DETECTED
QUEST GC CT AMPLIFIED (ALWAYS MESSAGE): NORMAL

## 2025-08-13 ENCOUNTER — TELEPHONE (OUTPATIENT)
Dept: PRIMARY CARE | Facility: CLINIC | Age: 35
End: 2025-08-13
Payer: MEDICAID
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
## 2025-08-13 DIAGNOSIS — S00.96XA TICK BITE OF HEAD, UNSPECIFIED PART, INITIAL ENCOUNTER: Primary | ICD-10-CM

## 2025-08-13 RX ORDER — DOXYCYCLINE 100 MG/1
100 CAPSULE ORAL 2 TIMES DAILY
Qty: 28 CAPSULE | Refills: 0 | Status: SHIPPED | OUTPATIENT
Start: 2025-08-13 | End: 2025-08-27

## 2025-08-26 ENCOUNTER — APPOINTMENT (OUTPATIENT)
Dept: OBSTETRICS AND GYNECOLOGY | Facility: CLINIC | Age: 35
End: 2025-08-26
Payer: MEDICAID

## 2025-08-26 VITALS — SYSTOLIC BLOOD PRESSURE: 100 MMHG | DIASTOLIC BLOOD PRESSURE: 64 MMHG

## 2025-08-26 DIAGNOSIS — Z30.431 IUD CHECK UP: Primary | ICD-10-CM

## 2025-08-26 PROCEDURE — 3078F DIAST BP <80 MM HG: CPT | Performed by: NURSE PRACTITIONER

## 2025-08-26 PROCEDURE — 3074F SYST BP LT 130 MM HG: CPT | Performed by: NURSE PRACTITIONER

## 2025-08-26 PROCEDURE — 99212 OFFICE O/P EST SF 10 MIN: CPT | Performed by: NURSE PRACTITIONER

## 2025-08-26 PROCEDURE — 1036F TOBACCO NON-USER: CPT | Performed by: NURSE PRACTITIONER

## 2025-11-18 ENCOUNTER — APPOINTMENT (OUTPATIENT)
Dept: ENDOCRINOLOGY | Facility: CLINIC | Age: 35
End: 2025-11-18
Payer: MEDICAID